# Patient Record
Sex: FEMALE | Race: WHITE | NOT HISPANIC OR LATINO
[De-identification: names, ages, dates, MRNs, and addresses within clinical notes are randomized per-mention and may not be internally consistent; named-entity substitution may affect disease eponyms.]

---

## 2017-02-03 ENCOUNTER — RESULT REVIEW (OUTPATIENT)
Age: 77
End: 2017-02-03

## 2017-02-04 ENCOUNTER — APPOINTMENT (OUTPATIENT)
Dept: DERMATOLOGY | Facility: CLINIC | Age: 77
End: 2017-02-04

## 2017-04-25 ENCOUNTER — APPOINTMENT (OUTPATIENT)
Dept: DERMATOLOGY | Facility: CLINIC | Age: 77
End: 2017-04-25

## 2017-05-03 ENCOUNTER — FORM ENCOUNTER (OUTPATIENT)
Age: 77
End: 2017-05-03

## 2017-05-04 ENCOUNTER — APPOINTMENT (OUTPATIENT)
Dept: CT IMAGING | Facility: CLINIC | Age: 77
End: 2017-05-04

## 2017-05-04 ENCOUNTER — OUTPATIENT (OUTPATIENT)
Dept: OUTPATIENT SERVICES | Facility: HOSPITAL | Age: 77
LOS: 1 days | End: 2017-05-04
Payer: MEDICARE

## 2017-05-04 DIAGNOSIS — R91.8 OTHER NONSPECIFIC ABNORMAL FINDING OF LUNG FIELD: ICD-10-CM

## 2017-05-04 PROCEDURE — 71250 CT THORAX DX C-: CPT

## 2017-05-09 ENCOUNTER — MESSAGE (OUTPATIENT)
Age: 77
End: 2017-05-09

## 2017-05-10 ENCOUNTER — APPOINTMENT (OUTPATIENT)
Dept: DERMATOLOGY | Facility: CLINIC | Age: 77
End: 2017-05-10

## 2017-05-10 ENCOUNTER — RESULT REVIEW (OUTPATIENT)
Age: 77
End: 2017-05-10

## 2017-05-22 ENCOUNTER — APPOINTMENT (OUTPATIENT)
Dept: PULMONOLOGY | Facility: CLINIC | Age: 77
End: 2017-05-22

## 2017-05-22 VITALS
DIASTOLIC BLOOD PRESSURE: 60 MMHG | HEART RATE: 66 BPM | HEIGHT: 59 IN | BODY MASS INDEX: 24.19 KG/M2 | SYSTOLIC BLOOD PRESSURE: 120 MMHG | OXYGEN SATURATION: 98 % | WEIGHT: 120 LBS

## 2017-05-24 ENCOUNTER — APPOINTMENT (OUTPATIENT)
Dept: DERMATOLOGY | Facility: CLINIC | Age: 77
End: 2017-05-24

## 2017-05-25 ENCOUNTER — APPOINTMENT (OUTPATIENT)
Dept: DERMATOLOGY | Facility: CLINIC | Age: 77
End: 2017-05-25

## 2017-09-06 ENCOUNTER — APPOINTMENT (OUTPATIENT)
Dept: DERMATOLOGY | Facility: CLINIC | Age: 77
End: 2017-09-06
Payer: MEDICARE

## 2017-09-06 PROCEDURE — 99212 OFFICE O/P EST SF 10 MIN: CPT

## 2017-11-20 ENCOUNTER — APPOINTMENT (OUTPATIENT)
Dept: PULMONOLOGY | Facility: CLINIC | Age: 77
End: 2017-11-20
Payer: MEDICARE

## 2017-11-20 VITALS
OXYGEN SATURATION: 98 % | BODY MASS INDEX: 24.19 KG/M2 | SYSTOLIC BLOOD PRESSURE: 120 MMHG | WEIGHT: 120 LBS | DIASTOLIC BLOOD PRESSURE: 64 MMHG | HEART RATE: 76 BPM | HEIGHT: 59 IN

## 2017-11-20 PROCEDURE — 85018 HEMOGLOBIN: CPT | Mod: QW

## 2017-11-20 PROCEDURE — 94727 GAS DIL/WSHOT DETER LNG VOL: CPT

## 2017-11-20 PROCEDURE — 94729 DIFFUSING CAPACITY: CPT

## 2017-11-20 PROCEDURE — 99215 OFFICE O/P EST HI 40 MIN: CPT | Mod: 25

## 2017-11-20 PROCEDURE — 94010 BREATHING CAPACITY TEST: CPT

## 2018-03-02 ENCOUNTER — FORM ENCOUNTER (OUTPATIENT)
Age: 78
End: 2018-03-02

## 2018-03-03 ENCOUNTER — APPOINTMENT (OUTPATIENT)
Dept: CT IMAGING | Facility: CLINIC | Age: 78
End: 2018-03-03
Payer: MEDICARE

## 2018-03-03 ENCOUNTER — OUTPATIENT (OUTPATIENT)
Dept: OUTPATIENT SERVICES | Facility: HOSPITAL | Age: 78
LOS: 1 days | End: 2018-03-03
Payer: MEDICARE

## 2018-03-03 DIAGNOSIS — R91.8 OTHER NONSPECIFIC ABNORMAL FINDING OF LUNG FIELD: ICD-10-CM

## 2018-03-03 PROCEDURE — 71250 CT THORAX DX C-: CPT

## 2018-03-03 PROCEDURE — 71250 CT THORAX DX C-: CPT | Mod: 26

## 2018-03-05 ENCOUNTER — RESULT REVIEW (OUTPATIENT)
Age: 78
End: 2018-03-05

## 2018-03-06 ENCOUNTER — APPOINTMENT (OUTPATIENT)
Dept: DERMATOLOGY | Facility: CLINIC | Age: 78
End: 2018-03-06
Payer: MEDICARE

## 2018-03-06 PROCEDURE — 11440 EXC FACE-MM B9+MARG 0.5 CM/<: CPT

## 2018-03-06 PROCEDURE — 11100 BX SKIN SUBCUTANEOUS&/MUCOUS MEMBRANE 1 LESION: CPT | Mod: 59

## 2018-03-06 PROCEDURE — 99213 OFFICE O/P EST LOW 20 MIN: CPT | Mod: 25

## 2018-03-12 ENCOUNTER — APPOINTMENT (OUTPATIENT)
Dept: PULMONOLOGY | Facility: CLINIC | Age: 78
End: 2018-03-12
Payer: MEDICARE

## 2018-03-12 ENCOUNTER — CHART COPY (OUTPATIENT)
Age: 78
End: 2018-03-12

## 2018-03-12 VITALS
DIASTOLIC BLOOD PRESSURE: 60 MMHG | WEIGHT: 120 LBS | OXYGEN SATURATION: 98 % | HEART RATE: 74 BPM | BODY MASS INDEX: 24.19 KG/M2 | HEIGHT: 59 IN | SYSTOLIC BLOOD PRESSURE: 120 MMHG

## 2018-03-12 PROCEDURE — 94010 BREATHING CAPACITY TEST: CPT

## 2018-03-12 PROCEDURE — 99215 OFFICE O/P EST HI 40 MIN: CPT | Mod: 25

## 2018-03-12 RX ORDER — AZELASTINE HYDROCHLORIDE 137 UG/1
0.1 SPRAY, METERED NASAL
Qty: 30 | Refills: 0 | Status: DISCONTINUED | COMMUNITY
Start: 2018-02-27

## 2018-03-12 RX ORDER — AMOXICILLIN AND CLAVULANATE POTASSIUM 875; 125 MG/1; MG/1
875-125 TABLET, COATED ORAL
Qty: 20 | Refills: 0 | Status: DISCONTINUED | COMMUNITY
Start: 2018-01-30

## 2018-03-12 RX ORDER — RANITIDINE 150 MG/1
150 TABLET ORAL
Qty: 90 | Refills: 0 | Status: ACTIVE | COMMUNITY
Start: 2018-02-21

## 2018-03-13 ENCOUNTER — APPOINTMENT (OUTPATIENT)
Dept: DERMATOLOGY | Facility: CLINIC | Age: 78
End: 2018-03-13

## 2018-03-15 ENCOUNTER — APPOINTMENT (OUTPATIENT)
Dept: DERMATOLOGY | Facility: CLINIC | Age: 78
End: 2018-03-15
Payer: MEDICARE

## 2018-03-15 PROCEDURE — ZZZZZ: CPT

## 2018-05-15 ENCOUNTER — APPOINTMENT (OUTPATIENT)
Dept: DERMATOLOGY | Facility: CLINIC | Age: 78
End: 2018-05-15

## 2018-06-15 ENCOUNTER — APPOINTMENT (OUTPATIENT)
Dept: DERMATOLOGY | Facility: CLINIC | Age: 78
End: 2018-06-15

## 2018-07-08 ENCOUNTER — FORM ENCOUNTER (OUTPATIENT)
Age: 78
End: 2018-07-08

## 2018-07-09 ENCOUNTER — APPOINTMENT (OUTPATIENT)
Dept: ULTRASOUND IMAGING | Facility: CLINIC | Age: 78
End: 2018-07-09
Payer: MEDICARE

## 2018-07-09 ENCOUNTER — OUTPATIENT (OUTPATIENT)
Dept: OUTPATIENT SERVICES | Facility: HOSPITAL | Age: 78
LOS: 1 days | End: 2018-07-09
Payer: MEDICARE

## 2018-07-09 ENCOUNTER — APPOINTMENT (OUTPATIENT)
Dept: PULMONOLOGY | Facility: CLINIC | Age: 78
End: 2018-07-09
Payer: MEDICARE

## 2018-07-09 ENCOUNTER — APPOINTMENT (OUTPATIENT)
Dept: RADIOLOGY | Facility: CLINIC | Age: 78
End: 2018-07-09
Payer: MEDICARE

## 2018-07-09 VITALS
SYSTOLIC BLOOD PRESSURE: 130 MMHG | HEART RATE: 81 BPM | BODY MASS INDEX: 25.6 KG/M2 | OXYGEN SATURATION: 95 % | DIASTOLIC BLOOD PRESSURE: 70 MMHG | HEIGHT: 59 IN | WEIGHT: 127 LBS

## 2018-07-09 DIAGNOSIS — R06.00 DYSPNEA, UNSPECIFIED: ICD-10-CM

## 2018-07-09 LAB — DEPRECATED D DIMER PPP IA-ACNC: 153 NG/ML DDU

## 2018-07-09 PROCEDURE — 99215 OFFICE O/P EST HI 40 MIN: CPT | Mod: 25

## 2018-07-09 PROCEDURE — 94010 BREATHING CAPACITY TEST: CPT

## 2018-07-09 PROCEDURE — 71046 X-RAY EXAM CHEST 2 VIEWS: CPT

## 2018-07-09 PROCEDURE — 93970 EXTREMITY STUDY: CPT

## 2018-07-09 PROCEDURE — 93970 EXTREMITY STUDY: CPT | Mod: 26

## 2018-07-09 PROCEDURE — 71046 X-RAY EXAM CHEST 2 VIEWS: CPT | Mod: 26

## 2018-07-09 RX ORDER — OMEPRAZOLE 40 MG/1
40 CAPSULE, DELAYED RELEASE ORAL
Qty: 30 | Refills: 0 | Status: DISCONTINUED | COMMUNITY
Start: 2018-02-10 | End: 2018-07-09

## 2018-07-09 RX ORDER — PANTOPRAZOLE 40 MG/1
40 TABLET, DELAYED RELEASE ORAL
Refills: 0 | Status: ACTIVE | COMMUNITY

## 2018-07-09 RX ORDER — PRAVASTATIN SODIUM 40 MG/1
40 TABLET ORAL
Refills: 0 | Status: ACTIVE | COMMUNITY

## 2018-07-24 ENCOUNTER — APPOINTMENT (OUTPATIENT)
Dept: DERMATOLOGY | Facility: CLINIC | Age: 78
End: 2018-07-24
Payer: MEDICARE

## 2018-07-24 PROCEDURE — 99213 OFFICE O/P EST LOW 20 MIN: CPT

## 2018-12-13 ENCOUNTER — APPOINTMENT (OUTPATIENT)
Dept: PULMONOLOGY | Facility: CLINIC | Age: 78
End: 2018-12-13
Payer: MEDICARE

## 2018-12-13 VITALS
BODY MASS INDEX: 24.24 KG/M2 | WEIGHT: 120 LBS | SYSTOLIC BLOOD PRESSURE: 132 MMHG | DIASTOLIC BLOOD PRESSURE: 74 MMHG | OXYGEN SATURATION: 97 % | HEART RATE: 84 BPM

## 2018-12-13 DIAGNOSIS — R91.8 OTHER NONSPECIFIC ABNORMAL FINDING OF LUNG FIELD: ICD-10-CM

## 2018-12-13 PROCEDURE — 99214 OFFICE O/P EST MOD 30 MIN: CPT

## 2018-12-13 RX ORDER — DICLOFENAC SODIUM 50 MG/1
50 TABLET, DELAYED RELEASE ORAL
Qty: 180 | Refills: 0 | Status: DISCONTINUED | COMMUNITY
Start: 2017-12-04 | End: 2018-12-13

## 2018-12-13 RX ORDER — AZELASTINE HYDROCHLORIDE AND FLUTICASONE PROPIONATE 137; 50 UG/1; UG/1
137-50 SPRAY, METERED NASAL
Qty: 1 | Refills: 3 | Status: DISCONTINUED | COMMUNITY
Start: 2017-05-22 | End: 2018-12-13

## 2019-01-27 ENCOUNTER — INPATIENT (INPATIENT)
Facility: HOSPITAL | Age: 79
LOS: 1 days | Discharge: HOME | DRG: 101 | End: 2019-01-29
Attending: INTERNAL MEDICINE | Admitting: HOSPITALIST
Payer: MEDICARE

## 2019-01-27 VITALS
HEART RATE: 104 BPM | SYSTOLIC BLOOD PRESSURE: 186 MMHG | DIASTOLIC BLOOD PRESSURE: 94 MMHG | WEIGHT: 119.93 LBS | TEMPERATURE: 98 F | OXYGEN SATURATION: 99 % | RESPIRATION RATE: 16 BRPM | HEIGHT: 62 IN

## 2019-01-27 DIAGNOSIS — R56.9 UNSPECIFIED CONVULSIONS: ICD-10-CM

## 2019-01-27 DIAGNOSIS — K21.0 GASTRO-ESOPHAGEAL REFLUX DISEASE WITH ESOPHAGITIS: ICD-10-CM

## 2019-01-27 DIAGNOSIS — I10 ESSENTIAL (PRIMARY) HYPERTENSION: ICD-10-CM

## 2019-01-27 DIAGNOSIS — E78.2 MIXED HYPERLIPIDEMIA: ICD-10-CM

## 2019-01-27 DIAGNOSIS — T78.40XD ALLERGY, UNSPECIFIED, SUBSEQUENT ENCOUNTER: ICD-10-CM

## 2019-01-27 LAB
ALBUMIN SERPL ELPH-MCNC: 4.1 G/DL — SIGNIFICANT CHANGE UP (ref 3.3–5.2)
ALP SERPL-CCNC: 103 U/L — SIGNIFICANT CHANGE UP (ref 40–120)
ALT FLD-CCNC: 21 U/L — SIGNIFICANT CHANGE UP
ANION GAP SERPL CALC-SCNC: 12 MMOL/L — SIGNIFICANT CHANGE UP (ref 5–17)
APTT BLD: 26.9 SEC — LOW (ref 27.5–36.3)
AST SERPL-CCNC: 23 U/L — SIGNIFICANT CHANGE UP
BASOPHILS # BLD AUTO: 0 K/UL — SIGNIFICANT CHANGE UP (ref 0–0.2)
BASOPHILS NFR BLD AUTO: 0.2 % — SIGNIFICANT CHANGE UP (ref 0–2)
BILIRUB SERPL-MCNC: 0.5 MG/DL — SIGNIFICANT CHANGE UP (ref 0.4–2)
BUN SERPL-MCNC: 18 MG/DL — SIGNIFICANT CHANGE UP (ref 8–20)
CALCIUM SERPL-MCNC: 9.7 MG/DL — SIGNIFICANT CHANGE UP (ref 8.6–10.2)
CHLORIDE SERPL-SCNC: 100 MMOL/L — SIGNIFICANT CHANGE UP (ref 98–107)
CO2 SERPL-SCNC: 28 MMOL/L — SIGNIFICANT CHANGE UP (ref 22–29)
CREAT SERPL-MCNC: 0.8 MG/DL — SIGNIFICANT CHANGE UP (ref 0.5–1.3)
EOSINOPHIL # BLD AUTO: 0.3 K/UL — SIGNIFICANT CHANGE UP (ref 0–0.5)
EOSINOPHIL NFR BLD AUTO: 1.7 % — SIGNIFICANT CHANGE UP (ref 0–6)
GLUCOSE SERPL-MCNC: 137 MG/DL — HIGH (ref 70–115)
HCT VFR BLD CALC: 43.9 % — SIGNIFICANT CHANGE UP (ref 37–47)
HGB BLD-MCNC: 14.2 G/DL — SIGNIFICANT CHANGE UP (ref 12–16)
INR BLD: 0.92 RATIO — SIGNIFICANT CHANGE UP (ref 0.88–1.16)
LACTATE BLDV-MCNC: 2 MMOL/L — SIGNIFICANT CHANGE UP (ref 0.5–2)
LYMPHOCYTES # BLD AUTO: 1.5 K/UL — SIGNIFICANT CHANGE UP (ref 1–4.8)
LYMPHOCYTES # BLD AUTO: 8.6 % — LOW (ref 20–55)
MCHC RBC-ENTMCNC: 27.6 PG — SIGNIFICANT CHANGE UP (ref 27–31)
MCHC RBC-ENTMCNC: 32.3 G/DL — SIGNIFICANT CHANGE UP (ref 32–36)
MCV RBC AUTO: 85.4 FL — SIGNIFICANT CHANGE UP (ref 81–99)
MONOCYTES # BLD AUTO: 1.2 K/UL — HIGH (ref 0–0.8)
MONOCYTES NFR BLD AUTO: 7 % — SIGNIFICANT CHANGE UP (ref 3–10)
NEUTROPHILS # BLD AUTO: 14.5 K/UL — HIGH (ref 1.8–8)
NEUTROPHILS NFR BLD AUTO: 81.7 % — HIGH (ref 37–73)
NT-PROBNP SERPL-SCNC: 75 PG/ML — SIGNIFICANT CHANGE UP (ref 0–300)
PLATELET # BLD AUTO: 399 K/UL — SIGNIFICANT CHANGE UP (ref 150–400)
POTASSIUM SERPL-MCNC: 3.7 MMOL/L — SIGNIFICANT CHANGE UP (ref 3.5–5.3)
POTASSIUM SERPL-SCNC: 3.7 MMOL/L — SIGNIFICANT CHANGE UP (ref 3.5–5.3)
PROT SERPL-MCNC: 7.2 G/DL — SIGNIFICANT CHANGE UP (ref 6.6–8.7)
PROTHROM AB SERPL-ACNC: 10.6 SEC — SIGNIFICANT CHANGE UP (ref 10–12.9)
RBC # BLD: 5.14 M/UL — SIGNIFICANT CHANGE UP (ref 4.4–5.2)
RBC # FLD: 14.3 % — SIGNIFICANT CHANGE UP (ref 11–15.6)
SODIUM SERPL-SCNC: 140 MMOL/L — SIGNIFICANT CHANGE UP (ref 135–145)
TROPONIN T SERPL-MCNC: <0.01 NG/ML — SIGNIFICANT CHANGE UP (ref 0–0.06)
WBC # BLD: 17.7 K/UL — HIGH (ref 4.8–10.8)
WBC # FLD AUTO: 17.7 K/UL — HIGH (ref 4.8–10.8)

## 2019-01-27 PROCEDURE — 99285 EMERGENCY DEPT VISIT HI MDM: CPT

## 2019-01-27 PROCEDURE — 99223 1ST HOSP IP/OBS HIGH 75: CPT | Mod: AI

## 2019-01-27 PROCEDURE — 93010 ELECTROCARDIOGRAM REPORT: CPT

## 2019-01-27 PROCEDURE — 70450 CT HEAD/BRAIN W/O DYE: CPT | Mod: 26

## 2019-01-27 RX ORDER — RANITIDINE HYDROCHLORIDE 150 MG/1
150 TABLET, FILM COATED ORAL DAILY
Qty: 0 | Refills: 0 | Status: DISCONTINUED | OUTPATIENT
Start: 2019-01-27 | End: 2019-01-28

## 2019-01-27 RX ORDER — INFLUENZA VIRUS VACCINE 15; 15; 15; 15 UG/.5ML; UG/.5ML; UG/.5ML; UG/.5ML
0.5 SUSPENSION INTRAMUSCULAR ONCE
Qty: 0 | Refills: 0 | Status: DISCONTINUED | OUTPATIENT
Start: 2019-01-27 | End: 2019-01-29

## 2019-01-27 RX ORDER — PANTOPRAZOLE SODIUM 20 MG/1
40 TABLET, DELAYED RELEASE ORAL
Qty: 0 | Refills: 0 | Status: DISCONTINUED | OUTPATIENT
Start: 2019-01-27 | End: 2019-01-29

## 2019-01-27 RX ORDER — LOSARTAN POTASSIUM 100 MG/1
25 TABLET, FILM COATED ORAL
Qty: 0 | Refills: 0 | Status: DISCONTINUED | OUTPATIENT
Start: 2019-01-27 | End: 2019-01-29

## 2019-01-27 RX ORDER — SODIUM CHLORIDE 9 MG/ML
1000 INJECTION INTRAMUSCULAR; INTRAVENOUS; SUBCUTANEOUS ONCE
Qty: 0 | Refills: 0 | Status: COMPLETED | OUTPATIENT
Start: 2019-01-27 | End: 2019-01-27

## 2019-01-27 RX ORDER — LEVETIRACETAM 250 MG/1
500 TABLET, FILM COATED ORAL
Qty: 0 | Refills: 0 | Status: DISCONTINUED | OUTPATIENT
Start: 2019-01-27 | End: 2019-01-29

## 2019-01-27 RX ORDER — ATORVASTATIN CALCIUM 80 MG/1
10 TABLET, FILM COATED ORAL AT BEDTIME
Qty: 0 | Refills: 0 | Status: DISCONTINUED | OUTPATIENT
Start: 2019-01-27 | End: 2019-01-29

## 2019-01-27 RX ORDER — ENOXAPARIN SODIUM 100 MG/ML
40 INJECTION SUBCUTANEOUS EVERY 24 HOURS
Qty: 0 | Refills: 0 | Status: DISCONTINUED | OUTPATIENT
Start: 2019-01-27 | End: 2019-01-29

## 2019-01-27 RX ORDER — MONTELUKAST 4 MG/1
10 TABLET, CHEWABLE ORAL AT BEDTIME
Qty: 0 | Refills: 0 | Status: DISCONTINUED | OUTPATIENT
Start: 2019-01-27 | End: 2019-01-29

## 2019-01-27 RX ADMIN — SODIUM CHLORIDE 2000 MILLILITER(S): 9 INJECTION INTRAMUSCULAR; INTRAVENOUS; SUBCUTANEOUS at 11:56

## 2019-01-27 RX ADMIN — LEVETIRACETAM 500 MILLIGRAM(S): 250 TABLET, FILM COATED ORAL at 15:56

## 2019-01-27 RX ADMIN — ATORVASTATIN CALCIUM 10 MILLIGRAM(S): 80 TABLET, FILM COATED ORAL at 22:32

## 2019-01-27 RX ADMIN — MONTELUKAST 10 MILLIGRAM(S): 4 TABLET, CHEWABLE ORAL at 22:32

## 2019-01-27 NOTE — H&P ADULT - NSHPPHYSICALEXAM_GEN_ALL_CORE
Normocephalic   EOMI PERRL  Neck supple no JVD  S1 and S2 regular   CTA B  Abd soft + BS not tender   No pedal edema, no calf tenderness no peripheral cyanosis  AAO X3 no focal neurologic deficit  No skin rash no eruptions on skin  Anxious.  Mood and affect are appropriate.

## 2019-01-27 NOTE — ED PROVIDER NOTE - ENMT, MLM
Airway patent, Nasal mucosa clear. Mouth with normal mucosa. dry blood in mouth  +small lac to right side of tongue

## 2019-01-27 NOTE — H&P ADULT - PROBLEM SELECTOR PLAN 1
Syncope is a consideration - Per Cardiology cardiogenic syncope is unlikely as patient had normal PET myocarial perfusion scan in 2018  Neurology evaluated , started on keppra 500mg po bid   EEG  MRA Brain   Mri Brain

## 2019-01-27 NOTE — ED ADULT NURSE NOTE - OBJECTIVE STATEMENT
Pt was upstairs complaining of pain in pit of her stomach overnight, nausea and vertigo, pt has been taking dramamine. Pt had breakfast this AM without issue, pt  states she was going to go upstairs and rest. When  went upstairs he states pt was slanted "slumped across the couch and not responsive" Pt  called 911, attempted to put pt on her back, and she kept rolling to L her side. Pt  states pt had bloody saliva coming out of her mouth "from biting her tongue and her lip" and having "snoring sounds" or snoring respirations during the episode. Pt states she remembers eating breakfast this morning but does not remember going upstairs or the events after.  Pt states she has had seizures before, last episode x7 years ago  states it was "diagnosed as syncope". Upon assessment - pt alert and oriented x3, pt with laceration to L side of bridge of nose, dried blood on lower lip, lac noted to b/l tounge, heart sounds are within normal limits, lung sounds are clear b/l, abd is soft and nontender - positive bowel sounds in all four quadrants, skin is warm, dry and appropriate for age and race. Pt and family educated on plan of care, plan of care taught back to RN. Will continue to educate and update pt throughout ED stay. Pt BIBA for "siezure" this AM. Pt states overnight she had stomach pain, nausea and vertigo, pt has been taking dramamine. Pt had breakfast this AM without issue, pt  states she was going to go upstairs and rest. When  went upstairs to look for her, he states pt was "unresponsive" and "slumped across the couch not responding to me shaking her" Pt  called 911, attempted to put pt on her back, and she kept rolling to L her side. Pt  states pt had bloody saliva coming out of her mouth "from biting her tongue and her lip" and making "snoring sounds" during the episode. Pt states she remembers eating breakfast this morning but does not remember going upstairs or the events after.  Pt states she has had seizures before when she was pregnant, and the last episode x7 years ago. Pt  states the last "siezure" was "diagnosed as syncope".   Upon assessment - pt alert and oriented x3, pt with laceration to L side of bridge of nose, dried blood on lower lip, lac noted to b/l tounge, heart sounds are within normal limits, lung sounds are clear b/l, abd is soft and nontender - positive bowel sounds in all four quadrants, skin is warm, dry and appropriate for age and race. Pt and family educated on plan of care, plan of care taught back to RN. Will continue to educate and update pt throughout ED stay.

## 2019-01-27 NOTE — ED ADULT NURSE REASSESSMENT NOTE - NS ED NURSE REASSESS COMMENT FT1
pt handed off to RN TANIA in [facility], in stable condition. Pt oriented to unit, plan of care explained. call bell system explained to pt. no apparent distress noted at this time.  remains at bedside.

## 2019-01-27 NOTE — ED PROVIDER NOTE - OBJECTIVE STATEMENT
79 yo female hx of HTN; presents with episode of LOC and AMS concerning for seizure vs syncope; patient awoke this AM feeling well, started as normal morning, breakfast and tea; went back upstairs to lay down, was on phone with a friend; after running an errand,  came back found her unconscious, sprawled out on cough, +dry blood in mouth, copious saliva; had apparently wet herself as well; EMS arrived, brought to hospital; patient with no memory of events; states had one seizure related to pregnancy approx 50 yrs ago; and one other episode similar to today's where she was hospitalized at [facility] but diagnosed with syncope approx 5 yrs ago

## 2019-01-27 NOTE — ED PROVIDER NOTE - MEDICAL DECISION MAKING DETAILS
apparent new onset seizure activity vs syncope; neuro and cards called ([facility]); as per neuro, start keppra; MR ordered

## 2019-01-27 NOTE — ED ADULT TRIAGE NOTE - CHIEF COMPLAINT QUOTE
Patient is awake and oriented times 3, brought in via ambulance, no family present on arrival, as per ems, patient had a seizure, history of seizure "a long time ago" patient has a laceration to the bridge of her nose, blood sugar in triage 163, patient denies any pain, no blood thinners reported

## 2019-01-27 NOTE — CONSULT NOTE ADULT - SUBJECTIVE AND OBJECTIVE BOX
Patient is a 78y old  Female who presents with a chief complaint of seizure    HPI:  78-yo female with a history of seizure and dizziness presented with an episode of recurrent seizure activity this morning. The patient was found to be unresponsive with mouth foaming, tongue biting and incontinence, followed with post-ictal confusion. The patient reported poor sleep last night. There was no fever, headache, nausea or vomiting. The patient has a history of intermittent dizziness / lightheadedness. Patient's  reported an episode " syncope" 5-6 years ago and had witnessed [place] seizure 40 years ago. The patient was on phenobarbital therapy.     PAST MEDICAL & SURGICAL HISTORY:  knee replacement    REVIEW OF SYSTEMS:  System review as documented in the record.       MEDICATIONS  (STANDING):    MEDICATIONS  (PRN):      Allergies    latex (Unknown)  No Known Drug Allergies    Intolerances        SOCIAL HISTORY:    FAMILY HISTORY:  Daughter : cerebral aneurysm      PHYSICAL EXAM:  Vital Signs Last 24 Hrs  T(F): 98.2 (01-27-19 @ 10:30)  HR: 104 (01-27-19 @ 10:30)  BP: 186/94 (01-27-19 @ 10:30)  RR: 16 (01-27-19 @ 10:30)    GENERAL: No acute distress  HEAD:  Atraumatic, Normocephalic  NERVOUS SYSTEM:    Alert & Oriented, speech fluent, follow commands  Cranial nerves exam: II-XII normal, except hearing impairment   Motor exam: no pronator drift,  Strength 5/5 B/L upper and lower extremities.  Sensory exam: normal.  DTR: 1-2+ symmetric, plantar responses flexor bilaterally.  No dysmetria bilaterally      LABS:                        14.2   17.7  )-----------( 399      ( 27 Jan 2019 11:51 )             43.9     01-27    140  |  100  |  18.0  ----------------------------<  137<H>  3.7   |  28.0  |  0.80    Ca    9.7      27 Jan 2019 11:51    TPro  7.2  /  Alb  4.1  /  TBili  0.5  /  DBili  x   /  AST  23  /  ALT  21  /  AlkPhos  103  01-27    PT/INR - ( 27 Jan 2019 11:51 )   PT: 10.6 sec;   INR: 0.92 ratio         PTT - ( 27 Jan 2019 11:51 )  PTT:26.9 sec        RADIOLOGY & ADDITIONAL STUDIES:  Head CT IMPRESSION:  Mild chronic microvascular changes without evidence of an   acute transcortical infarction or hemorrhage. MR is a more sensitive   imaging modality for the evaluation of an acute infarction.     IMPRESSION:  Recurrent seizure, history of seizure disorder  History of dizziness  Family history of cerebral aneurysm    PLAN:  - Brain MRI w/o contrast  - Head MRA w/o contrast  - EEG  - discussed with patient's family regarding seizure treatment, benefit for treatment, side effects of medication and risk w/o treatment were discussed  - patient and family agree to take Keppra (500mg BID)  - Ativan PRN for seizure  - we also discussed safety issues, including driving. Patient will follow [place] law.
[facility], [facility]                                   [address] [place] [address]                                                      PHONE: [phone]                                                         FAX: [phone]  [email or web address]  ---------------------------------------------------------------------------------------------------------------------------------    Reason for Consult: syncope    HPI:  IGA AUGIRRE is an 78y Female HTN, remote seizure history presented this morning after being found unresponsive by her . Her  says he found the patient slumped over on the couch with blood tinged sputum coming from her mouth. Upon arrival to ED she was confused. The last thing the patient remembers was watching TV this morning. She had a seizure approximately 50 years ago. She denies any cardiovascular symptoms prior to or after today's episode.     PAST MEDICAL & SURGICAL HISTORY:  HTN (hypertension)      latex (Unknown)  No Known Drug Allergies      MEDICATIONS  (STANDING):  levETIRAcetam 500 milliGRAM(s) Oral two times a day    MEDICATIONS  (PRN):  LORazepam   Injectable 2 milliGRAM(s) IntraMuscular every 6 hours PRN for seizure      Social History:  Cigarettes: none               Alchohol:                 Illicit Drug Abuse:      Family History:  no CAD    ROS: Negative other than as mentioned in HPI.    Vital Signs Last 24 Hrs  T(C): 36.8 (27 Jan 2019 10:30), Max: 36.8 (27 Jan 2019 10:30)  T(F): 98.2 (27 Jan 2019 10:30), Max: 98.2 (27 Jan 2019 10:30)  HR: 83 (27 Jan 2019 16:15) (83 - 104)  BP: 160/73 (27 Jan 2019 16:15) (160/73 - 186/94)  BP(mean): --  RR: 18 (27 Jan 2019 16:15) (16 - 18)  SpO2: 100% (27 Jan 2019 16:15) (99% - 100%)  ICU Vital Signs Last 24 Hrs  GIA AGUIRRE  I&O's Detail    I&O's Summary    Drug Dosing Weight  GIA AGUIRRE      PHYSICAL EXAM:  General: Appears well developed, well nourished alert and cooperative.  HEENT: Head; normocephalic, atraumatic. bite marks on both sides of tongue with dry blood  Eyes: Pupils reactive, cornea wnl.  Neck: Supple, no nodes adenopathy, no NVD or carotid bruit or thyromegaly.  CARDIOVASCULAR: Normal S1 and S2, No murmur, rub, gallop or lift.   LUNGS: No rales, rhonchi or wheeze. Normal breath sounds bilaterally.  ABDOMEN: Soft, nontender without mass or organomegaly. bowel sounds normoactive.  EXTREMITIES: No clubbing, cyanosis or edema. Distal pulses wnl.   SKIN: warm and dry with normal turgor.  NEURO: Alert/oriented x 3/normal motor exam. No pathologic reflexes.    PSYCH: normal affect.        LABS:                        14.2   17.7  )-----------( 399      ( 27 Jan 2019 11:51 )             43.9     01-27    140  |  100  |  18.0  ----------------------------<  137<H>  3.7   |  28.0  |  0.80    Ca    9.7      27 Jan 2019 11:51    TPro  7.2  /  Alb  4.1  /  TBili  0.5  /  DBili  x   /  AST  23  /  ALT  21  /  AlkPhos  103  01-27    GIA AGUIRRE  CARDIAC MARKERS ( 27 Jan 2019 11:51 )  x     / <0.01 ng/mL / x     / x     / x          PT/INR - ( 27 Jan 2019 11:51 )   PT: 10.6 sec;   INR: 0.92 ratio         PTT - ( 27 Jan 2019 11:51 )  PTT:26.9 sec      RADIOLOGY & ADDITIONAL STUDIES:    INTERPRETATION OF TELEMETRY (personally reviewed): sinus rhythm without arrhythmia    Assessment and Plan:  In summary, GIA AGUIRRE is an 78y Female with past medical history significant for HTN, remote seizure history admitted with seizure episode this morning. No clinical evidence of cardiogenic syncope. Telemetry is normal. Patient had normal PET myocardial perfusion scan and echo in 2018.    Seizure  - no further inpatient cardiac workup indicated at this time. I will arrange for close outpatient follow up with Dr. Grayson.    Thank you for allowing [facility] to participate in the care of this patient. Please do not hesitate to call with any questions or concerns.

## 2019-01-27 NOTE — H&P ADULT - HISTORY OF PRESENT ILLNESS
78 yr female with history of hypertension controlled with diovan , has had one episode  seizure 40 years ago and a syncope or passed out 6 yrs ago.   said she was talking to someone on the phone morning of admission sitting on a couch and suddenly went silent.  The phone continued to to ring a pause or hold note, so  came to see what was amiss and found her slumped on the couch unresponsive, foaming from her mouth, blood tinge, bitten her tongue on both sides, wet and sonorous respiration.   called the ambulance.  Patient came to in the ambulance about 10-15 minutes after the episode.  Labs show wbc elevation 17.7  brain ct show no acute pathology .  Patient now full awake orientated and conversant.

## 2019-01-27 NOTE — ED ADULT NURSE NOTE - NSIMPLEMENTINTERV_GEN_ALL_ED
Implemented All Universal Safety Interventions:  [place] to call system. Call bell, personal items and telephone within reach. Instruct patient to call for assistance. Room bathroom lighting operational. Non-slip footwear when patient is off stretcher. Physically safe environment: no spills, clutter or unnecessary equipment. Stretcher in lowest position, wheels locked, appropriate side rails in place.

## 2019-01-27 NOTE — H&P ADULT - FAMILY HISTORY
No pertinent family history in first degree relatives Mother  Still living? No  Family history of cerebrovascular accident (CVA) in mother, Age at diagnosis: Age Unknown  Family history of acute myocardial infarction, Age at diagnosis: Age Unknown     Father  Still living? Unknown  Family history of diabetes mellitus in father, Age at diagnosis: Age Unknown

## 2019-01-28 LAB
ANION GAP SERPL CALC-SCNC: 10 MMOL/L — SIGNIFICANT CHANGE UP (ref 5–17)
BUN SERPL-MCNC: 11 MG/DL — SIGNIFICANT CHANGE UP (ref 8–20)
CALCIUM SERPL-MCNC: 9.1 MG/DL — SIGNIFICANT CHANGE UP (ref 8.6–10.2)
CHLORIDE SERPL-SCNC: 104 MMOL/L — SIGNIFICANT CHANGE UP (ref 98–107)
CO2 SERPL-SCNC: 26 MMOL/L — SIGNIFICANT CHANGE UP (ref 22–29)
CREAT SERPL-MCNC: 0.61 MG/DL — SIGNIFICANT CHANGE UP (ref 0.5–1.3)
GLUCOSE SERPL-MCNC: 106 MG/DL — SIGNIFICANT CHANGE UP (ref 70–115)
HCT VFR BLD CALC: 44.2 % — SIGNIFICANT CHANGE UP (ref 37–47)
HGB BLD-MCNC: 14.3 G/DL — SIGNIFICANT CHANGE UP (ref 12–16)
MCHC RBC-ENTMCNC: 28.2 PG — SIGNIFICANT CHANGE UP (ref 27–31)
MCHC RBC-ENTMCNC: 32.4 G/DL — SIGNIFICANT CHANGE UP (ref 32–36)
MCV RBC AUTO: 87.2 FL — SIGNIFICANT CHANGE UP (ref 81–99)
PLATELET # BLD AUTO: 355 K/UL — SIGNIFICANT CHANGE UP (ref 150–400)
POTASSIUM SERPL-MCNC: 4 MMOL/L — SIGNIFICANT CHANGE UP (ref 3.5–5.3)
POTASSIUM SERPL-SCNC: 4 MMOL/L — SIGNIFICANT CHANGE UP (ref 3.5–5.3)
RBC # BLD: 5.07 M/UL — SIGNIFICANT CHANGE UP (ref 4.4–5.2)
RBC # FLD: 14.4 % — SIGNIFICANT CHANGE UP (ref 11–15.6)
SODIUM SERPL-SCNC: 140 MMOL/L — SIGNIFICANT CHANGE UP (ref 135–145)
WBC # BLD: 10.6 K/UL — SIGNIFICANT CHANGE UP (ref 4.8–10.8)
WBC # FLD AUTO: 10.6 K/UL — SIGNIFICANT CHANGE UP (ref 4.8–10.8)

## 2019-01-28 PROCEDURE — 99233 SBSQ HOSP IP/OBS HIGH 50: CPT

## 2019-01-28 PROCEDURE — 93880 EXTRACRANIAL BILAT STUDY: CPT | Mod: 26

## 2019-01-28 PROCEDURE — 95819 EEG AWAKE AND ASLEEP: CPT | Mod: 26

## 2019-01-28 RX ORDER — FAMOTIDINE 10 MG/ML
20 INJECTION INTRAVENOUS DAILY
Qty: 0 | Refills: 0 | Status: DISCONTINUED | OUTPATIENT
Start: 2019-01-28 | End: 2019-01-29

## 2019-01-28 RX ADMIN — LEVETIRACETAM 500 MILLIGRAM(S): 250 TABLET, FILM COATED ORAL at 06:28

## 2019-01-28 RX ADMIN — PANTOPRAZOLE SODIUM 40 MILLIGRAM(S): 20 TABLET, DELAYED RELEASE ORAL at 06:28

## 2019-01-28 RX ADMIN — MONTELUKAST 10 MILLIGRAM(S): 4 TABLET, CHEWABLE ORAL at 22:27

## 2019-01-28 RX ADMIN — LEVETIRACETAM 500 MILLIGRAM(S): 250 TABLET, FILM COATED ORAL at 17:26

## 2019-01-28 RX ADMIN — LOSARTAN POTASSIUM 25 MILLIGRAM(S): 100 TABLET, FILM COATED ORAL at 06:28

## 2019-01-28 RX ADMIN — ATORVASTATIN CALCIUM 10 MILLIGRAM(S): 80 TABLET, FILM COATED ORAL at 22:27

## 2019-01-28 RX ADMIN — ENOXAPARIN SODIUM 40 MILLIGRAM(S): 100 INJECTION SUBCUTANEOUS at 11:06

## 2019-01-28 RX ADMIN — FAMOTIDINE 20 MILLIGRAM(S): 10 INJECTION INTRAVENOUS at 11:07

## 2019-01-28 RX ADMIN — LOSARTAN POTASSIUM 25 MILLIGRAM(S): 100 TABLET, FILM COATED ORAL at 17:26

## 2019-01-28 NOTE — PROGRESS NOTE ADULT - SUBJECTIVE AND OBJECTIVE BOX
CC: Seizure episode with tongue bitting .   HPI:  78 yr female with history of hypertension controlled with diovan , has had one episode  seizure 40 years ago and a syncope or passed out 6 yrs ago.   said she was talking to someone on the phone morning of admission sitting on a couch and suddenly went silent.  The phone continued to to ring a pause or hold note, so  came to see what was amiss and found her slumped on the couch unresponsive, foaming from her mouth, blood tinge, bitten her tongue on both sides, wet and sonorous respiration.   called the ambulance.  Patient came to in the ambulance about 10-15 minutes after the episode.  Labs show wbc elevation 17.7  brain ct show no acute pathology .  Patient now full awake orientated and conversant. (27 Jan 2019 18:08)    REVIEW OF SYSTEMS:    Patient denied fever, chills, abdominal pain, nausea, vomiting, cough, shortness of breath, chest pain or palpitations    Vital Signs Last 24 Hrs  T(C): 36.7 (28 Jan 2019 16:30), Max: 37.6 (28 Jan 2019 11:29)  T(F): 98.1 (28 Jan 2019 16:30), Max: 99.6 (28 Jan 2019 11:29)  HR: 71 (28 Jan 2019 16:30) (67 - 75)  BP: 124/69 (28 Jan 2019 16:30) (107/60 - 145/74)  BP(mean): --  RR: 18 (28 Jan 2019 16:30) (16 - 18)  SpO2: 97% (28 Jan 2019 16:30) (93% - 97%)I&O's Summary    PHYSICAL EXAM:  GENERAL: NAD, well-groomed  HEENT: PERRL, +EOMI, anicteric, no [place]  NECK: Supple, No JVD   CHEST/LUNG: CTA bilaterally; Normal effort  HEART: S1S2 Normal intensity, no murmurs, gallops or rubs noted  ABDOMEN: Soft, BS Normoactive, NT, ND, no HSM noted  EXTREMITIES:  2+ radial and DP pulses noted, no clubbing, cyanosis, or edema noted, FROM x 4  SKIN: No rashes or lesions noted  NEURO: A&Ox3, no focal deficits noted, CN II-XII intact  PSYCH: normal mood and affect; insight/judgement appropriate  LABS:                        14.3   10.6  )-----------( 355      ( 28 Jan 2019 06:51 )             44.2     01-28    140  |  104  |  11.0  ----------------------------<  106  4.0   |  26.0  |  0.61    Ca    9.1      28 Jan 2019 06:51    TPro  7.2  /  Alb  4.1  /  TBili  0.5  /  DBili  x   /  AST  23  /  ALT  21  /  AlkPhos  103  01-27    PT/INR - ( 27 Jan 2019 11:51 )   PT: 10.6 sec;   INR: 0.92 ratio         PTT - ( 27 Jan 2019 11:51 )  PTT:26.9 sec    RADIOLOGY & ADDITIONAL TESTS:    MEDICATIONS:  MEDICATIONS  (STANDING):  atorvastatin 10 milliGRAM(s) Oral at bedtime  enoxaparin Injectable 40 milliGRAM(s) SubCutaneous every 24 hours  famotidine    Tablet 20 milliGRAM(s) Oral daily  influenza   Vaccine 0.5 milliLiter(s) IntraMuscular once  levETIRAcetam 500 milliGRAM(s) Oral two times a day  losartan 25 milliGRAM(s) Oral two times a day  montelukast 10 milliGRAM(s) Oral at bedtime  pantoprazole    Tablet 40 milliGRAM(s) Oral before breakfast    MEDICATIONS  (PRN):  LORazepam   Injectable 2 milliGRAM(s) IntraMuscular every 6 hours PRN for seizure

## 2019-01-28 NOTE — PROGRESS NOTE ADULT - SUBJECTIVE AND OBJECTIVE BOX
INTERVAL HISTORY:  no further spells, feels welll=      VITAL SIGNS:  Vital Signs Last 24 Hrs  T(C): 36.5 (28 Jan 2019 07:32), Max: 36.8 (27 Jan 2019 10:30)  T(F): 97.7 (28 Jan 2019 07:32), Max: 98.3 (27 Jan 2019 23:57)  HR: 71 (28 Jan 2019 07:32) (67 - 104)  BP: 107/60 (28 Jan 2019 07:32) (107/60 - 186/94)  BP(mean): --  RR: 16 (28 Jan 2019 07:32) (16 - 18)  SpO2: 93% (28 Jan 2019 07:32) (93% - 100%)    PHYSICAL EXAMINATION:    Mentation:  nl  Language/Speech: nl  CN: nl   Visual Fields:ful  Motor: nl  Sensory: nl  DTR: nl  Babinski:sunny      MEDS:  MEDICATIONS  (STANDING):  atorvastatin 10 milliGRAM(s) Oral at bedtime  enoxaparin Injectable 40 milliGRAM(s) SubCutaneous every 24 hours  famotidine    Tablet 20 milliGRAM(s) Oral daily  influenza   Vaccine 0.5 milliLiter(s) IntraMuscular once  levETIRAcetam 500 milliGRAM(s) Oral two times a day  losartan 25 milliGRAM(s) Oral two times a day  montelukast 10 milliGRAM(s) Oral at bedtime  pantoprazole    Tablet 40 milliGRAM(s) Oral before breakfast    MEDICATIONS  (PRN):  LORazepam   Injectable 2 milliGRAM(s) IntraMuscular every 6 hours PRN for seizure      LABS:                          14.3   10.6  )-----------( 355      ( 28 Jan 2019 06:51 )             44.2     01-28    140  |  104  |  11.0  ----------------------------<  106  4.0   |  26.0  |  0.61    Ca    9.1      28 Jan 2019 06:51    TPro  7.2  /  Alb  4.1  /  TBili  0.5  /  DBili  x   /  AST  23  /  ALT  21  /  AlkPhos  103  01-27    LIVER FUNCTIONS - ( 27 Jan 2019 11:51 )  Alb: 4.1 g/dL / Pro: 7.2 g/dL / ALK PHOS: 103 U/L / ALT: 21 U/L / AST: 23 U/L / GGT: x               RADIOLOGY & ADDITIONAL STUDIES:    MRI, MRA, EEG-pending    IMPRESSION & PLAN:    Syncope vs seizure    Plan  Medical and Cardiac evaluation and treatment as indicated  MRI, MRA, EEG, duplex  Continue [facility]  Outpatient  24 hour EEG

## 2019-01-28 NOTE — PROGRESS NOTE ADULT - ASSESSMENT
78 yr female with history of hypertension presenting with seizure.        Problem/Plan - 1:  ·  Problem: New onset seizure.  Plan: Syncope is a consideration - Per Cardiology cardiogenic syncope is unlikely as patient had normal PET myocarial perfusion scan in 2018  Neurology evaluated , started on keppra 500mg po bid   EEG done awaiting report   MRA Brain pending   Mri Brain. pending   Carotid dopplers no hemodynamically significant stenosis.      Problem/Plan - 2:  ·  Problem: Essential hypertension.  Plan: Diovan 40mg po bid.      Problem/Plan - 3:  ·  Problem: Gastroesophageal reflux disease with esophagitis.  Plan: zantac 150mg po qHS  Protonix 40mg po daily AM.      Problem/Plan - 4:  ·  Problem: Mixed hyperlipidemia.  Plan: Pravastatin 20mg po daily.      Problem/Plan - 5:  ·  Problem: Allergic state, subsequent encounter.  Plan: Singulair 10mg po qhs.     Dispo;  Awaiting MRI/MRA.  If no significant abnormality will discharge home tomorrow.

## 2019-01-29 ENCOUNTER — TRANSCRIPTION ENCOUNTER (OUTPATIENT)
Age: 79
End: 2019-01-29

## 2019-01-29 VITALS
TEMPERATURE: 99 F | HEART RATE: 89 BPM | SYSTOLIC BLOOD PRESSURE: 111 MMHG | DIASTOLIC BLOOD PRESSURE: 61 MMHG | RESPIRATION RATE: 20 BRPM

## 2019-01-29 PROCEDURE — 82962 GLUCOSE BLOOD TEST: CPT

## 2019-01-29 PROCEDURE — 70551 MRI BRAIN STEM W/O DYE: CPT | Mod: 26

## 2019-01-29 PROCEDURE — 99239 HOSP IP/OBS DSCHRG MGMT >30: CPT

## 2019-01-29 PROCEDURE — 99285 EMERGENCY DEPT VISIT HI MDM: CPT

## 2019-01-29 PROCEDURE — 70450 CT HEAD/BRAIN W/O DYE: CPT

## 2019-01-29 PROCEDURE — 70551 MRI BRAIN STEM W/O DYE: CPT

## 2019-01-29 PROCEDURE — 36415 COLL VENOUS BLD VENIPUNCTURE: CPT

## 2019-01-29 PROCEDURE — 93005 ELECTROCARDIOGRAM TRACING: CPT

## 2019-01-29 PROCEDURE — 70544 MR ANGIOGRAPHY HEAD W/O DYE: CPT | Mod: 26,59

## 2019-01-29 PROCEDURE — 80053 COMPREHEN METABOLIC PANEL: CPT

## 2019-01-29 PROCEDURE — 84484 ASSAY OF TROPONIN QUANT: CPT

## 2019-01-29 PROCEDURE — 85610 PROTHROMBIN TIME: CPT

## 2019-01-29 PROCEDURE — 93880 EXTRACRANIAL BILAT STUDY: CPT

## 2019-01-29 PROCEDURE — 95819 EEG AWAKE AND ASLEEP: CPT

## 2019-01-29 PROCEDURE — 80048 BASIC METABOLIC PNL TOTAL CA: CPT

## 2019-01-29 PROCEDURE — 85730 THROMBOPLASTIN TIME PARTIAL: CPT

## 2019-01-29 PROCEDURE — 83880 ASSAY OF NATRIURETIC PEPTIDE: CPT

## 2019-01-29 PROCEDURE — 85027 COMPLETE CBC AUTOMATED: CPT

## 2019-01-29 PROCEDURE — 97163 PT EVAL HIGH COMPLEX 45 MIN: CPT

## 2019-01-29 PROCEDURE — 70544 MR ANGIOGRAPHY HEAD W/O DYE: CPT

## 2019-01-29 PROCEDURE — 83605 ASSAY OF LACTIC ACID: CPT

## 2019-01-29 RX ORDER — DOCUSATE SODIUM 100 MG
100 CAPSULE ORAL
Qty: 0 | Refills: 0 | Status: DISCONTINUED | OUTPATIENT
Start: 2019-01-29 | End: 2019-01-29

## 2019-01-29 RX ORDER — POLYETHYLENE GLYCOL 3350 17 G/17G
17 POWDER, FOR SOLUTION ORAL
Qty: 0 | Refills: 0 | DISCHARGE
Start: 2019-01-29

## 2019-01-29 RX ORDER — POLYETHYLENE GLYCOL 3350 17 G/17G
17 POWDER, FOR SOLUTION ORAL DAILY
Qty: 0 | Refills: 0 | Status: DISCONTINUED | OUTPATIENT
Start: 2019-01-29 | End: 2019-01-29

## 2019-01-29 RX ORDER — LEVETIRACETAM 250 MG/1
1 TABLET, FILM COATED ORAL
Qty: 60 | Refills: 0
Start: 2019-01-29 | End: 2019-02-27

## 2019-01-29 RX ORDER — DIPHENHYDRAMINE HYDROCHLORIDE AND LIDOCAINE HYDROCHLORIDE AND ALUMINUM HYDROXIDE AND MAGNESIUM HYDRO
10 KIT THREE TIMES A DAY
Qty: 0 | Refills: 0 | Status: DISCONTINUED | OUTPATIENT
Start: 2019-01-29 | End: 2019-01-29

## 2019-01-29 RX ADMIN — PANTOPRAZOLE SODIUM 40 MILLIGRAM(S): 20 TABLET, DELAYED RELEASE ORAL at 06:05

## 2019-01-29 RX ADMIN — LEVETIRACETAM 500 MILLIGRAM(S): 250 TABLET, FILM COATED ORAL at 18:21

## 2019-01-29 RX ADMIN — LOSARTAN POTASSIUM 25 MILLIGRAM(S): 100 TABLET, FILM COATED ORAL at 18:21

## 2019-01-29 RX ADMIN — LEVETIRACETAM 500 MILLIGRAM(S): 250 TABLET, FILM COATED ORAL at 05:10

## 2019-01-29 RX ADMIN — DIPHENHYDRAMINE HYDROCHLORIDE AND LIDOCAINE HYDROCHLORIDE AND ALUMINUM HYDROXIDE AND MAGNESIUM HYDRO 10 MILLILITER(S): KIT at 18:21

## 2019-01-29 RX ADMIN — LOSARTAN POTASSIUM 25 MILLIGRAM(S): 100 TABLET, FILM COATED ORAL at 05:10

## 2019-01-29 RX ADMIN — POLYETHYLENE GLYCOL 3350 17 GRAM(S): 17 POWDER, FOR SOLUTION ORAL at 18:21

## 2019-01-29 NOTE — DISCHARGE NOTE ADULT - PLAN OF CARE
. start Keppra 500mg bid  f/u Neurology c/w home medication Diovan - discuss with PCP to switch to Losartan c/w home medication

## 2019-01-29 NOTE — PROGRESS NOTE ADULT - SUBJECTIVE AND OBJECTIVE BOX
INTERVAL HISTORY:    stable, no further spells    VITAL SIGNS:  Vital Signs Last 24 Hrs  T(C): 36.9 (29 Jan 2019 04:41), Max: 37.6 (28 Jan 2019 11:29)  T(F): 98.4 (29 Jan 2019 04:41), Max: 99.6 (28 Jan 2019 11:29)  HR: 74 (29 Jan 2019 07:24) (66 - 79)  BP: 129/69 (29 Jan 2019 04:41) (116/70 - 133/71)  BP(mean): --  RR: 16 (28 Jan 2019 20:54) (16 - 18)  SpO2: 94% (29 Jan 2019 04:41) (94% - 97%)    PHYSICAL EXAMINATION:    Mentation:  nl  Language/Speech: nl  CN:  Visual Fields: full  Motor:nl  Sensory:  DTR:  Babinski:      MEDS:  MEDICATIONS  (STANDING):  atorvastatin 10 milliGRAM(s) Oral at bedtime  docusate sodium 100 milliGRAM(s) Oral two times a day  enoxaparin Injectable 40 milliGRAM(s) SubCutaneous every 24 hours  famotidine    Tablet 20 milliGRAM(s) Oral daily  FIRST- Mouthwash  BLM 10 milliLiter(s) Swish and Spit three times a day  influenza   Vaccine 0.5 milliLiter(s) IntraMuscular once  levETIRAcetam 500 milliGRAM(s) Oral two times a day  losartan 25 milliGRAM(s) Oral two times a day  montelukast 10 milliGRAM(s) Oral at bedtime  pantoprazole    Tablet 40 milliGRAM(s) Oral before breakfast  polyethylene glycol 3350 17 Gram(s) Oral daily    MEDICATIONS  (PRN):  LORazepam   Injectable 2 milliGRAM(s) IntraMuscular every 6 hours PRN for seizure      LABS:                          14.3   10.6  )-----------( 355      ( 28 Jan 2019 06:51 )             44.2     01-28    140  |  104  |  11.0  ----------------------------<  106  4.0   |  26.0  |  0.61    Ca    9.1      28 Jan 2019 06:51    TPro  7.2  /  Alb  4.1  /  TBili  0.5  /  DBili  x   /  AST  23  /  ALT  21  /  AlkPhos  103  01-27    LIVER FUNCTIONS - ( 27 Jan 2019 11:51 )  Alb: 4.1 g/dL / Pro: 7.2 g/dL / ALK PHOS: 103 U/L / ALT: 21 U/L / AST: 23 U/L / GGT: x               RADIOLOGY & ADDITIONAL STUDIES:    duplex- neg  EEG- complete,will review  MRI/A pending    IMPRESSION & PLAN:    Continue current treatment  Further recommendations to follow work up

## 2019-01-29 NOTE — DISCHARGE NOTE ADULT - SECONDARY DIAGNOSIS.
Essential hypertension Gastroesophageal reflux disease with esophagitis Mixed hyperlipidemia Allergic state, subsequent encounter

## 2019-01-29 NOTE — DISCHARGE NOTE ADULT - MEDICATION SUMMARY - MEDICATIONS TO TAKE
I will START or STAY ON the medications listed below when I get home from the hospital:    valsartan 40 mg oral tablet  -- 1 tab(s) by mouth 2 times a day  -- Indication: For Home medication - discuss with PCP to switch to losartan    Keppra 500 mg oral tablet  -- 1 tab(s) by mouth 2 times a day   -- Check with your doctor before becoming pregnant.  It is very important that you take or use this exactly as directed.  Do not skip doses or discontinue unless directed by your doctor.  May cause drowsiness or dizziness.  Obtain medical advice before taking any non-prescription drugs as some may affect the action of this medication.  Swallow whole.  Do not crush.  This drug may impair the ability to drive or operate machinery.  Use care until you become familiar with its effects.    -- Indication: For seizure    pravastatin 40 mg oral tablet  -- 1 tab(s) by mouth once a day  -- Indication: For Hld    Advair Diskus 250 mcg-50 mcg inhalation powder  -- 1 puff(s) inhaled 2 times a day  -- Indication: For Home medication    raNITIdine 150 mg oral capsule  -- 1 cap(s) by mouth once a day  -- Indication: For GERDf    polyethylene glycol 3350 oral powder for reconstitution  -- 17 gram(s) by mouth once a day  -- Indication: For Constipation    montelukast 10 mg oral tablet  -- 1 tab(s) by mouth once a day  -- Indication: For Allergic state, subsequent encounter    azelastine 137 mcg/inh (0.1%) nasal spray  -- 2 spray(s) into nose once a day, As Needed  -- Indication: For Allergic state, subsequent encounter    pantoprazole 40 mg oral delayed release tablet  -- 1 tab(s) by mouth once a day  -- Indication: For GERD (gastroesophageal reflux disease)

## 2019-01-29 NOTE — PHYSICAL THERAPY INITIAL EVALUATION ADULT - ADDITIONAL COMMENTS
per patient she has ~12 steps total to get to the living space with a single hand rail. Pt owns a RW however does not use one. Her  will be home to assist as needed.

## 2019-01-29 NOTE — DISCHARGE NOTE ADULT - CARE PLAN
Principal Discharge DX:	New onset seizure  Goal:	.  Assessment and plan of treatment:	start Keppra 500mg bid  f/u Neurology  Secondary Diagnosis:	Essential hypertension  Assessment and plan of treatment:	c/w home medication Diovan - discuss with PCP to switch to Losartan  Secondary Diagnosis:	Gastroesophageal reflux disease with esophagitis  Assessment and plan of treatment:	c/w home medication  Secondary Diagnosis:	Mixed hyperlipidemia  Secondary Diagnosis:	Allergic state, subsequent encounter

## 2019-01-29 NOTE — PROGRESS NOTE ADULT - ASSESSMENT
78 yr female with history of hypertension presenting with seizure. Seen by cardiology and Neurology, Per Cardiology cardiogenic syncope is unlikely as patient had normal PET myocarial perfusion scan in 2018, per Neurology started on Keppra.       A/P    >: New onset seizure.  Plan: Syncope is a consideration - Per Cardiology cardiogenic syncope is unlikely as patient had normal PET myocarial perfusion scan in 2018  Neurology evaluated , started on keppra 500mg po bid   EEG Is a borderline abnormal study only due to the presence of  occasional bilateral frontotemporal slowing. It is a nonspecific finding.  There are no seizure discharges a localizing features  MRA Brain pending   Mri Brain. pending   Carotid dopplers no hemodynamically significant stenosis.     > Essential hypertension.  Plan: Diovan 40mg po bid.     >Gastroesophageal reflux disease with esophagitis.  Plan: zantac 150mg po qHS  Protonix 40mg po daily AM.     > Mixed hyperlipidemia.  Plan: Pravastatin 20mg po daily.     >Allergic state, subsequent encounter.  Plan: Singulair 10mg po qhs.     >DVT PPX - Lovenox 78 yr female with history of hypertension presenting with seizure. Seen by cardiology and Neurology, Per Cardiology cardiogenic syncope is unlikely as patient had normal PET myocarial perfusion scan in 2018, per Neurology started on Keppra.       A/P    >: New onset seizure.  Plan: Syncope is a consideration - Per Cardiology cardiogenic syncope is unlikely as patient had normal PET myocarial perfusion scan in 2018  Neurology evaluated , started on keppra 500mg po bid   EEG Is a borderline abnormal study only due to the presence of  occasional bilateral frontotemporal slowing. It is a nonspecific finding.  There are no seizure discharges a localizing features  MRA Brain pending   Mri Brain. pending   Carotid dopplers no hemodynamically significant stenosis.   Pt eval pending    > Essential hypertension.  Plan: Diovan 40mg po bid.     >Gastroesophageal reflux disease with esophagitis.  Plan: zantac 150mg po qHS  Protonix 40mg po daily AM.     > Mixed hyperlipidemia.  Plan: Pravastatin 20mg po daily.     >Allergic state, subsequent encounter.  Plan: Singulair 10mg po qhs.     >DVT PPX - Lovenox

## 2019-01-29 NOTE — DISCHARGE NOTE ADULT - HOSPITAL COURSE
78 yr female with history of hypertension presenting with seizure. Seen by cardiology and Neurology, Per Cardiology cardiogenic syncope is unlikely as patient had normal PET myocarial perfusion scan in 2018, per Neurology started on Keppra. Pt had MRI and MRA brain no acute finding, discussed with Dr. Rjei yeh to discharge home on Keppra. Pt was seen by PT- cleared. Discussed with  present bedside, updated      A/P    >: New onset seizure.  Plan: Syncope is a consideration - Per Cardiology cardiogenic syncope is unlikely as patient had normal PET myocarial perfusion scan in 2018  Neurology evaluated , started on keppra 500mg po bid   EEG Is a borderline abnormal study only due to the presence of  occasional bilateral frontotemporal slowing. It is a nonspecific finding.  There are no seizure discharges a localizing features  MRA Brain < from: MR Angio Head No Cont (01.29.19 @ 11:57) >  No evidence of intracranial aneurysm or hemodynamically significant   stenosis on MRA.     < end of copied text >    Mri Brain. < from: MR Head No Cont (01.29.19 @ 12:12) >    Cerebral atrophy. Scattered nonspecific foci of T2 prolongation in the   deep and subcortical distribution suggestive of small vessel ischemic   changes.    < end of copied text >    Carotid dopplers no hemodynamically significant stenosis.   Pt eval pending    > Essential hypertension.  Plan: Diovan 40mg po bid.     >Gastroesophageal reflux disease with esophagitis.  Plan: zantac 150mg po qHS  Protonix 40mg po daily AM.     > Mixed hyperlipidemia.  Plan: Pravastatin 20mg po daily.     time spent 42  minutes

## 2019-01-29 NOTE — DISCHARGE NOTE ADULT - CARE PROVIDER_API CALL
Santana Mendoza), Neurology  [address] [place] [address]  Phone: [phone]  Fax: [phone]    Ladinsky, Michael A (), Family Medicine; Geriatric Medicine  [address] [place] [address]  Phone: [phone]  Fax: [phone]

## 2019-01-29 NOTE — PHYSICAL THERAPY INITIAL EVALUATION ADULT - DISCHARGE DISPOSITION, PT EVAL
home, no skilled inpatient or home care PT needs. Pt would benefit from continued outpatient PT for maintenance. However, pt will not be followed by PT at this time.

## 2019-01-29 NOTE — PROGRESS NOTE ADULT - ATTENDING COMMENTS
"September 28, 2018      Paresh Yang  [address]  [place] [address]      Dear Parent or Guardian of Paresh Yang    We are writing to inform you of your child's test results.    Paresh's urine test showed a trace of protein.  This can be normal, but since he had the history of the kidney problem in the past we should probably repeat it in a few weeks.  I will put in an order for a urine test. You can make a \"lab only\" appointment and come in and leave a sample in a month or so.         Resulted Orders   UA reflex to Microscopic and Culture   Result Value Ref Range    Color Urine Yellow     Appearance Urine Clear     Glucose Urine Negative NEG^Negative mg/dL    Bilirubin Urine Negative NEG^Negative    Ketones Urine Trace (A) NEG^Negative mg/dL    Specific Gravity Urine 1.025 1.003 - 1.035    Blood Urine Negative NEG^Negative    pH Urine 6.5 5.0 - 7.0 pH    Protein Albumin Urine 30 (A) NEG^Negative mg/dL    Urobilinogen Urine 0.2 0.2 - 1.0 EU/dL    Nitrite Urine Negative NEG^Negative    Leukocyte Esterase Urine Negative NEG^Negative    Source Midstream Urine    Urine Microscopic   Result Value Ref Range    WBC Urine 0 - 5 OTO5^0 - 5 /HPF    RBC Urine 2-5 (A) OTO2^O - 2 /HPF    Squamous Epithelial /LPF Urine Few FEW^Few /LPF    Bacteria Urine Few (A) NEG^Negative /HPF       If you have any questions or concerns, please call the clinic at the number listed above.     Sincerely,  Maddison Renae MD    " discussed with her  present bedside, updated

## 2019-01-29 NOTE — PROGRESS NOTE ADULT - SUBJECTIVE AND OBJECTIVE BOX
Internal Medicine Hospitalist - Dr. Watson    GIA JUANCANDIMERARI    [id number]    78y      Female    Patient is a 78y old  Female who presents with a chief complaint of Syncope and collapse   Possible seizure (29 Jan 2019 10:20)    INTERVAL HPI/ OVERNIGHT EVENTS: Patient is seen and examined, denied headache, dizziness, abd. pain, nausea and vomiting    REVIEW OF SYSTEMS:    Denied fever, chills, abd. pain, nausea, vomiting, chest pain, SOB, headache, dizziness    PHYSICAL EXAM:    Vital Signs Last 24 Hrs  T(C): 36.9 (29 Jan 2019 04:41), Max: 37.6 (28 Jan 2019 11:29)  T(F): 98.4 (29 Jan 2019 04:41), Max: 99.6 (28 Jan 2019 11:29)  HR: 74 (29 Jan 2019 07:24) (66 - 79)  BP: 129/69 (29 Jan 2019 04:41) (116/70 - 133/71)  BP(mean): --  RR: 16 (28 Jan 2019 20:54) (16 - 18)  SpO2: 94% (29 Jan 2019 04:41) (94% - 97%)    GENERAL: NAD  CHEST/LUNG: CTA b/l   HEART: S1S2+ audible  ABDOMEN: Soft, Nontender, Nondistended; Bowel sounds present  EXTREMITIES:  no edema  CNS: AAO X 3, non focal  Psychiatry: normal mood    LABS:                        14.3   10.6  )-----------( 355      ( 28 Jan 2019 06:51 )             44.2     01-28    140  |  104  |  11.0  ----------------------------<  106  4.0   |  26.0  |  0.61    Ca    9.1      28 Jan 2019 06:51    TPro  7.2  /  Alb  4.1  /  TBili  0.5  /  DBili  x   /  AST  23  /  ALT  21  /  AlkPhos  103  01-27    PT/INR - ( 27 Jan 2019 11:51 )   PT: 10.6 sec;   INR: 0.92 ratio         PTT - ( 27 Jan 2019 11:51 )  PTT:26.9 sec        MEDICATIONS  (STANDING):  atorvastatin 10 milliGRAM(s) Oral at bedtime  docusate sodium 100 milliGRAM(s) Oral two times a day  enoxaparin Injectable 40 milliGRAM(s) SubCutaneous every 24 hours  famotidine    Tablet 20 milliGRAM(s) Oral daily  FIRST- Mouthwash  BLM 10 milliLiter(s) Swish and Spit three times a day  influenza   Vaccine 0.5 milliLiter(s) IntraMuscular once  levETIRAcetam 500 milliGRAM(s) Oral two times a day  losartan 25 milliGRAM(s) Oral two times a day  montelukast 10 milliGRAM(s) Oral at bedtime  pantoprazole    Tablet 40 milliGRAM(s) Oral before breakfast  polyethylene glycol 3350 17 Gram(s) Oral daily    MEDICATIONS  (PRN):  LORazepam   Injectable 2 milliGRAM(s) IntraMuscular every 6 hours PRN for seizure      RADIOLOGY & ADDITIONAL TEST    < from: EEG Awake and Asleep (01.28.19 @ 14:55) >  Is a borderline abnormal study only due to the presence of   occasional bilateral frontotemporal slowing. It is a nonspecific finding.   There are no seizure discharges a localizing features    < end of copied text >

## 2019-01-29 NOTE — DISCHARGE NOTE ADULT - PATIENT PORTAL LINK FT
You can access the [facility] Patient Portal, offered by [facility], by registering with the following website: [email or web address]

## 2019-01-29 NOTE — PROGRESS NOTE ADULT - REASON FOR ADMISSION
Syncope and collapse   Possible seizure
seizure

## 2019-02-05 ENCOUNTER — APPOINTMENT (OUTPATIENT)
Dept: DERMATOLOGY | Facility: CLINIC | Age: 79
End: 2019-02-05

## 2019-07-26 RX ORDER — LEVETIRACETAM 500 MG/1
500 TABLET, FILM COATED ORAL
Refills: 0 | Status: ACTIVE | COMMUNITY

## 2019-07-29 PROBLEM — K21.9 GASTRO-ESOPHAGEAL REFLUX DISEASE WITHOUT ESOPHAGITIS: Chronic | Status: ACTIVE | Noted: 2019-01-27

## 2019-07-29 PROBLEM — I10 ESSENTIAL (PRIMARY) HYPERTENSION: Chronic | Status: ACTIVE | Noted: 2019-01-27

## 2019-07-31 ENCOUNTER — OUTPATIENT (OUTPATIENT)
Dept: OUTPATIENT SERVICES | Facility: HOSPITAL | Age: 79
LOS: 1 days | End: 2019-07-31
Payer: MEDICARE

## 2019-07-31 ENCOUNTER — TRANSCRIPTION ENCOUNTER (OUTPATIENT)
Age: 79
End: 2019-07-31

## 2019-07-31 ENCOUNTER — APPOINTMENT (OUTPATIENT)
Dept: PULMONOLOGY | Facility: CLINIC | Age: 79
End: 2019-07-31

## 2019-07-31 VITALS
TEMPERATURE: 98 F | RESPIRATION RATE: 20 BRPM | OXYGEN SATURATION: 100 % | SYSTOLIC BLOOD PRESSURE: 146 MMHG | HEART RATE: 76 BPM | DIASTOLIC BLOOD PRESSURE: 70 MMHG

## 2019-07-31 VITALS
DIASTOLIC BLOOD PRESSURE: 67 MMHG | HEART RATE: 75 BPM | OXYGEN SATURATION: 95 % | RESPIRATION RATE: 18 BRPM | SYSTOLIC BLOOD PRESSURE: 118 MMHG

## 2019-07-31 DIAGNOSIS — R07.9 CHEST PAIN, UNSPECIFIED: ICD-10-CM

## 2019-07-31 LAB
ANION GAP SERPL CALC-SCNC: 12 MMOL/L — SIGNIFICANT CHANGE UP (ref 5–17)
APTT BLD: 28.3 SEC — SIGNIFICANT CHANGE UP (ref 27.5–36.3)
BUN SERPL-MCNC: 16 MG/DL — SIGNIFICANT CHANGE UP (ref 8–20)
CALCIUM SERPL-MCNC: 9.7 MG/DL — SIGNIFICANT CHANGE UP (ref 8.6–10.2)
CHLORIDE SERPL-SCNC: 96 MMOL/L — LOW (ref 98–107)
CO2 SERPL-SCNC: 25 MMOL/L — SIGNIFICANT CHANGE UP (ref 22–29)
CREAT SERPL-MCNC: 0.56 MG/DL — SIGNIFICANT CHANGE UP (ref 0.5–1.3)
GLUCOSE SERPL-MCNC: 110 MG/DL — SIGNIFICANT CHANGE UP (ref 70–115)
HCT VFR BLD CALC: 40.9 % — SIGNIFICANT CHANGE UP (ref 34.5–45)
HGB BLD-MCNC: 14.2 G/DL — SIGNIFICANT CHANGE UP (ref 11.5–15.5)
INR BLD: 1 RATIO — SIGNIFICANT CHANGE UP (ref 0.88–1.16)
MCHC RBC-ENTMCNC: 28.8 PG — SIGNIFICANT CHANGE UP (ref 27–34)
MCHC RBC-ENTMCNC: 34.7 GM/DL — SIGNIFICANT CHANGE UP (ref 32–36)
MCV RBC AUTO: 83 FL — SIGNIFICANT CHANGE UP (ref 80–100)
PLATELET # BLD AUTO: 378 K/UL — SIGNIFICANT CHANGE UP (ref 150–400)
POTASSIUM SERPL-MCNC: 3.8 MMOL/L — SIGNIFICANT CHANGE UP (ref 3.5–5.3)
POTASSIUM SERPL-SCNC: 3.8 MMOL/L — SIGNIFICANT CHANGE UP (ref 3.5–5.3)
PROTHROM AB SERPL-ACNC: 11.5 SEC — SIGNIFICANT CHANGE UP (ref 10–12.9)
RBC # BLD: 4.93 M/UL — SIGNIFICANT CHANGE UP (ref 3.8–5.2)
RBC # FLD: 13.6 % — SIGNIFICANT CHANGE UP (ref 10.3–14.5)
SODIUM SERPL-SCNC: 133 MMOL/L — LOW (ref 135–145)
WBC # BLD: 9.17 K/UL — SIGNIFICANT CHANGE UP (ref 3.8–10.5)
WBC # FLD AUTO: 9.17 K/UL — SIGNIFICANT CHANGE UP (ref 3.8–10.5)

## 2019-07-31 PROCEDURE — 85730 THROMBOPLASTIN TIME PARTIAL: CPT

## 2019-07-31 PROCEDURE — 93010 ELECTROCARDIOGRAM REPORT: CPT

## 2019-07-31 PROCEDURE — 93567 NJX CAR CTH SPRVLV AORTGRPHY: CPT

## 2019-07-31 PROCEDURE — 36415 COLL VENOUS BLD VENIPUNCTURE: CPT

## 2019-07-31 PROCEDURE — 80048 BASIC METABOLIC PNL TOTAL CA: CPT

## 2019-07-31 PROCEDURE — 85027 COMPLETE CBC AUTOMATED: CPT

## 2019-07-31 PROCEDURE — C1894: CPT

## 2019-07-31 PROCEDURE — 93005 ELECTROCARDIOGRAM TRACING: CPT

## 2019-07-31 PROCEDURE — 93458 L HRT ARTERY/VENTRICLE ANGIO: CPT

## 2019-07-31 PROCEDURE — C1887: CPT

## 2019-07-31 PROCEDURE — C1760: CPT

## 2019-07-31 PROCEDURE — 85610 PROTHROMBIN TIME: CPT

## 2019-07-31 RX ORDER — VALSARTAN 80 MG/1
1 TABLET ORAL
Qty: 0 | Refills: 0 | DISCHARGE

## 2019-07-31 RX ORDER — PANTOPRAZOLE SODIUM 20 MG/1
1 TABLET, DELAYED RELEASE ORAL
Qty: 0 | Refills: 0 | DISCHARGE

## 2019-07-31 RX ORDER — AZELASTINE 137 UG/1
2 SPRAY, METERED NASAL
Qty: 0 | Refills: 0 | DISCHARGE

## 2019-07-31 RX ORDER — MONTELUKAST 4 MG/1
1 TABLET, CHEWABLE ORAL
Qty: 0 | Refills: 0 | DISCHARGE

## 2019-07-31 RX ORDER — RANITIDINE HYDROCHLORIDE 150 MG/1
1 TABLET, FILM COATED ORAL
Qty: 0 | Refills: 0 | DISCHARGE

## 2019-07-31 RX ORDER — FLUTICASONE PROPIONATE AND SALMETEROL 50; 250 UG/1; UG/1
1 POWDER ORAL; RESPIRATORY (INHALATION)
Qty: 0 | Refills: 0 | DISCHARGE

## 2019-07-31 NOTE — H&P PST ADULT - HISTORY OF PRESENT ILLNESS
This is a 80 yo female with prior history of Asthma well controlled by Dr Garcia, prior cath in 2010 which was normal, newly diagnosed siezure disorder on Keppra from Dr Mendoza.  She has has intermittent chest pain with rest and activity. Pharmacological NST was abnormal with a normal EF of 70%.  She is a non smoker asn is active with aerobic classes 4 X week.   she presents for a [facility] for ischemic evaluation.

## 2019-07-31 NOTE — H&P PST ADULT - NSICDXFAMILYHX_GEN_ALL_CORE_FT
FAMILY HISTORY:  Father  Still living? Unknown  Family history of diabetes mellitus in father, Age at diagnosis: Age Unknown    Mother  Still living? No  Family history of acute myocardial infarction, Age at diagnosis: Age Unknown  Family history of cerebrovascular accident (CVA) in mother, Age at diagnosis: Age Unknown

## 2019-07-31 NOTE — DISCHARGE NOTE PROVIDER - NSDCCPCAREPLAN_GEN_ALL_CORE_FT
PRINCIPAL DISCHARGE DIAGNOSIS  Diagnosis: Arteriosclerotic heart disease (ASHD)  Assessment and Plan of Treatment:

## 2019-07-31 NOTE — DISCHARGE NOTE PROVIDER - CARE PROVIDER_API CALL
Villa Crow)  Cardiovascular Disease; Interventional Cardiology  [address] [place] [address]  Phone: [phone]  Fax: [phone]  Follow Up Time:

## 2019-07-31 NOTE — DISCHARGE NOTE NURSING/CASE MANAGEMENT/SOCIAL WORK - NSDCDPATPORTLINK_GEN_ALL_CORE
You can access the [facility] Patient [facility], offered by [facility], by registering with the following website: [email or web address]

## 2019-07-31 NOTE — H&P PST ADULT - ASSESSMENT
Plan: PRE-PROCEDURE ASSESSMENT    -Patient seen and examined  -Labs reviewed  -Pre-procedure teaching completed with patient   -Questions answered about patients concerns     -instructed to NPO after midnight.   - Pt instructed to have escort to and from procedure

## 2019-08-06 ENCOUNTER — FORM ENCOUNTER (OUTPATIENT)
Age: 79
End: 2019-08-06

## 2019-08-07 ENCOUNTER — OUTPATIENT (OUTPATIENT)
Dept: OUTPATIENT SERVICES | Facility: HOSPITAL | Age: 79
LOS: 1 days | End: 2019-08-07
Payer: MEDICARE

## 2019-08-07 ENCOUNTER — APPOINTMENT (OUTPATIENT)
Dept: ULTRASOUND IMAGING | Facility: CLINIC | Age: 79
End: 2019-08-07
Payer: MEDICARE

## 2019-08-07 ENCOUNTER — APPOINTMENT (OUTPATIENT)
Dept: PULMONOLOGY | Facility: CLINIC | Age: 79
End: 2019-08-07
Payer: MEDICARE

## 2019-08-07 ENCOUNTER — APPOINTMENT (OUTPATIENT)
Dept: RADIOLOGY | Facility: CLINIC | Age: 79
End: 2019-08-07
Payer: MEDICARE

## 2019-08-07 VITALS
HEART RATE: 64 BPM | HEIGHT: 59 IN | BODY MASS INDEX: 23.99 KG/M2 | SYSTOLIC BLOOD PRESSURE: 120 MMHG | WEIGHT: 119 LBS | DIASTOLIC BLOOD PRESSURE: 64 MMHG | OXYGEN SATURATION: 98 %

## 2019-08-07 DIAGNOSIS — R06.00 DYSPNEA, UNSPECIFIED: ICD-10-CM

## 2019-08-07 LAB — DEPRECATED D DIMER PPP IA-ACNC: <150 NG/ML DDU

## 2019-08-07 PROCEDURE — 93970 EXTREMITY STUDY: CPT | Mod: 26

## 2019-08-07 PROCEDURE — 94010 BREATHING CAPACITY TEST: CPT

## 2019-08-07 PROCEDURE — 93970 EXTREMITY STUDY: CPT

## 2019-08-07 PROCEDURE — 71046 X-RAY EXAM CHEST 2 VIEWS: CPT | Mod: 26

## 2019-08-07 PROCEDURE — 71046 X-RAY EXAM CHEST 2 VIEWS: CPT

## 2019-08-07 PROCEDURE — 99215 OFFICE O/P EST HI 40 MIN: CPT | Mod: 25

## 2019-08-07 NOTE — PHYSICAL EXAM
[General Appearance - Well Developed] : well developed [Normal Appearance] : normal appearance [No Deformities] : no deformities [General Appearance - Well Nourished] : well nourished [Heart Sounds] : normal S1 and S2 [Heart Rate And Rhythm] : heart rate and rhythm were normal [Murmurs] : no murmurs present [Edema] : no peripheral edema present [Respiration, Rhythm And Depth] : normal respiratory rhythm and effort [Exaggerated Use Of Accessory Muscles For Inspiration] : no accessory muscle use [Auscultation Breath Sounds / Voice Sounds] : lungs were clear to auscultation bilaterally [Abnormal Walk] : normal gait [] : no rash [No Focal Deficits] : no focal deficits [Oriented To Time, Place, And Person] : oriented to person, place, and time [Impaired Insight] : insight and judgment were intact [Affect] : the affect was normal [Nail Clubbing] : no clubbing of the fingernails [Memory Recent] : recent memory was not impaired [Cyanosis, Localized] : no localized cyanosis [FreeTextEntry1] : no chest wall abn

## 2019-08-07 NOTE — DISCUSSION/SUMMARY
[FreeTextEntry1] : asthma by hx, recent catheterization without need for intervention\par  spirometry is normal and actually improved, exam without bronchospasm, normal sp02\par Dyspnea is disproportionate, very anxious during visit which i suspect is playing a role\par will obtain stat CXR, D Dimer, duplex, CTA if positive, no thrombophilic risk factors, suspicion not high\par continue advair at this time\par Ct chest 2018 with stable nodules x yrs \par vaccinations this fall \par 6-months or sooner if needed, will contact earlier with results

## 2019-08-07 NOTE — REVIEW OF SYSTEMS
[As Noted in HPI] : as noted in HPI [Negative] : Cardiovascular [de-identified] : sees allergist [FreeTextEntry9] : see Cardiologist/ chest pain work up negative

## 2019-08-07 NOTE — CONSULT LETTER
[Dear  ___] : Dear  [unfilled], [Consult Letter:] : I had the pleasure of evaluating your patient, [unfilled]. [Consult Closing:] : Thank you very much for allowing me to participate in the care of this patient.  If you have any questions, please do not hesitate to contact me. [Please see my note below.] : Please see my note below. [Sincerely,] : Sincerely, [DrDipti  ___] : Dr. BAINS [DrDipti ___] : Dr. BAINS [[facility], [facility]] : [facility] [Director, Respiratory Care] : Director, Respiratory Care [[facility]] : [facility] [FreeTextEntry3] : [facility]\par Pulmonary Critical Care\par Director Pulmonary Division\par Medical Director Respiratory Therapy\par [facility]\par \par

## 2019-08-07 NOTE — PROCEDURE
[FreeTextEntry1] : spirometry remains normal , actually improved from 2018\par CT 3/18: stable nodules , no change from 2010

## 2019-08-07 NOTE — HISTORY OF PRESENT ILLNESS
[FreeTextEntry1] : worsening dyspnea x 2 months with cough\par had recent catheterization, no blockages per pt\par BP and heart rate meds were adjusted\par uses advair  and singulair\par \par

## 2019-09-12 ENCOUNTER — APPOINTMENT (OUTPATIENT)
Dept: DERMATOLOGY | Facility: CLINIC | Age: 79
End: 2019-09-12

## 2020-02-11 ENCOUNTER — APPOINTMENT (OUTPATIENT)
Dept: PULMONOLOGY | Facility: CLINIC | Age: 80
End: 2020-02-11
Payer: MEDICARE

## 2020-02-11 ENCOUNTER — FORM ENCOUNTER (OUTPATIENT)
Age: 80
End: 2020-02-11

## 2020-02-11 VITALS
HEART RATE: 77 BPM | DIASTOLIC BLOOD PRESSURE: 76 MMHG | OXYGEN SATURATION: 97 % | HEIGHT: 59 IN | SYSTOLIC BLOOD PRESSURE: 122 MMHG | BODY MASS INDEX: 24.19 KG/M2 | WEIGHT: 120 LBS

## 2020-02-11 DIAGNOSIS — T78.40XA ALLERGY, UNSPECIFIED, INITIAL ENCOUNTER: ICD-10-CM

## 2020-02-11 DIAGNOSIS — R91.8 OTHER NONSPECIFIC ABNORMAL FINDING OF LUNG FIELD: ICD-10-CM

## 2020-02-11 PROCEDURE — 99215 OFFICE O/P EST HI 40 MIN: CPT | Mod: 25

## 2020-02-11 PROCEDURE — 94010 BREATHING CAPACITY TEST: CPT

## 2020-02-11 NOTE — REVIEW OF SYSTEMS
[As Noted in HPI] : as noted in HPI [Negative] : Cardiovascular [FreeTextEntry9] : see Cardiologist/ chest pain work up negative [de-identified] : sees allergist

## 2020-02-11 NOTE — CONSULT LETTER
[Dear  ___] : Dear  [unfilled], [Consult Letter:] : I had the pleasure of evaluating your patient, [unfilled]. [Please see my note below.] : Please see my note below. [Sincerely,] : Sincerely, [Consult Closing:] : Thank you very much for allowing me to participate in the care of this patient.  If you have any questions, please do not hesitate to contact me. [DrDipti  ___] : Dr. BAINS [DrDipti ___] : Dr. BAINS [Director, Respiratory Care] : Director, Respiratory Care [Lance James DO, [facility]] : [facility] [[facility]] : [facility] [FreeTextEntry3] : [facility]\par Pulmonary Critical Care\par Director Pulmonary Division\par Medical Director Respiratory Therapy\par [facility]\par \par

## 2020-02-11 NOTE — HISTORY OF PRESENT ILLNESS
[TextBox_4] : chronic exertional dyspnea\par has reflux and rhinitis\par no fever, chill, no sig chest pain\par uses advair bid   and singulair intermittently [FreeTextEntry1] : \par \par

## 2020-02-11 NOTE — DISCUSSION/SUMMARY
[FreeTextEntry1] : asthma by hx, recent catheterization without need for intervention\par  exam without bronchospasm, normal sp02\par Dyspnea is disproportionate, very anxious during visit which i suspect is playing a role\par spirometry remains normal, will repeat CXR\par continue advair at this time\par Ct chest 2018 with stable nodules x yrs \par vaccinations this winter\par 6-months or sooner if needed, will contact earlier with results

## 2020-02-11 NOTE — PHYSICAL EXAM
[Normal Appearance] : normal appearance [General Appearance - Well Developed] : well developed [No Deformities] : no deformities [General Appearance - Well Nourished] : well nourished [Heart Rate And Rhythm] : heart rate and rhythm were normal [Heart Sounds] : normal S1 and S2 [Murmurs] : no murmurs present [Edema] : no peripheral edema present [Exaggerated Use Of Accessory Muscles For Inspiration] : no accessory muscle use [Auscultation Breath Sounds / Voice Sounds] : lungs were clear to auscultation bilaterally [Respiration, Rhythm And Depth] : normal respiratory rhythm and effort [] : no rash [Abnormal Walk] : normal gait [Impaired Insight] : insight and judgment were intact [No Focal Deficits] : no focal deficits [Oriented To Time, Place, And Person] : oriented to person, place, and time [Memory Recent] : recent memory was not impaired [Affect] : the affect was normal [Nail Clubbing] : no clubbing of the fingernails [Cyanosis, Localized] : no localized cyanosis [FreeTextEntry1] : no chest wall abn

## 2020-02-12 ENCOUNTER — OUTPATIENT (OUTPATIENT)
Dept: OUTPATIENT SERVICES | Facility: HOSPITAL | Age: 80
LOS: 1 days | End: 2020-02-12
Payer: MEDICARE

## 2020-02-12 ENCOUNTER — APPOINTMENT (OUTPATIENT)
Dept: RADIOLOGY | Facility: CLINIC | Age: 80
End: 2020-02-12

## 2020-02-12 DIAGNOSIS — Z00.00 ENCOUNTER FOR GENERAL ADULT MEDICAL EXAMINATION WITHOUT ABNORMAL FINDINGS: ICD-10-CM

## 2020-02-12 PROCEDURE — 71046 X-RAY EXAM CHEST 2 VIEWS: CPT

## 2020-02-12 PROCEDURE — 71046 X-RAY EXAM CHEST 2 VIEWS: CPT | Mod: 26

## 2020-08-12 ENCOUNTER — APPOINTMENT (OUTPATIENT)
Dept: DERMATOLOGY | Facility: CLINIC | Age: 80
End: 2020-08-12
Payer: MEDICARE

## 2020-08-12 PROCEDURE — 99215 OFFICE O/P EST HI 40 MIN: CPT

## 2020-08-17 ENCOUNTER — APPOINTMENT (OUTPATIENT)
Dept: PULMONOLOGY | Facility: CLINIC | Age: 80
End: 2020-08-17
Payer: MEDICARE

## 2020-08-17 VITALS
BODY MASS INDEX: 23.09 KG/M2 | DIASTOLIC BLOOD PRESSURE: 70 MMHG | HEART RATE: 83 BPM | WEIGHT: 110 LBS | HEIGHT: 58 IN | OXYGEN SATURATION: 97 % | SYSTOLIC BLOOD PRESSURE: 110 MMHG

## 2020-08-17 PROCEDURE — 99214 OFFICE O/P EST MOD 30 MIN: CPT

## 2020-08-17 NOTE — CONSULT LETTER
[Consult Letter:] : I had the pleasure of evaluating your patient, [unfilled]. [Dear  ___] : Dear  [unfilled], [Please see my note below.] : Please see my note below. [Consult Closing:] : Thank you very much for allowing me to participate in the care of this patient.  If you have any questions, please do not hesitate to contact me. [Sincerely,] : Sincerely, [DrDipti  ___] : Dr. BAINS [FreeTextEntry3] : Lance [facility] DO [facility]\par Pulmonary Critical Care\par Director Pulmonary Division\par Medical Director Respiratory Therapy\par [facility]\par \par  [[facility] James DO, [facility]] : Lance Jaems DO, [facility] [DrDipti ___] : Dr. BAINS [[facility]] : [facility] [Director, Respiratory Care] : Director, Respiratory Care

## 2020-08-17 NOTE — REVIEW OF SYSTEMS
[As Noted in HPI] : as noted in HPI [Negative] : Cardiovascular [FreeTextEntry9] : see Cardiologist/ chest pain work up negative [de-identified] : sees allergist

## 2020-08-17 NOTE — DISCUSSION/SUMMARY
[FreeTextEntry1] : asthma by hx, recent catheterization without need for intervention\par  exam without bronchospasm, normal sp02\par Dyspnea is better when walking, pt feels it is anxiety\par last spirometry was normal, CXR 2/20 stable \par continue advair at this time\par Ct chest 2018 with stable nodules x yrs \par vaccinations this winter\par 6-months or sooner if needed, will contact earlier with results

## 2020-08-17 NOTE — HISTORY OF PRESENT ILLNESS
[TextBox_4] : chronic exertional dyspnea\par walk regularly\par has reflux and rhinitis\par no fever, chill, no sig chest pain\par uses advair bid   and singulair intermittently [FreeTextEntry1] : \par \par

## 2020-08-17 NOTE — PHYSICAL EXAM
[General Appearance - Well Developed] : well developed [Normal Appearance] : normal appearance [General Appearance - Well Nourished] : well nourished [No Deformities] : no deformities [Heart Rate And Rhythm] : heart rate and rhythm were normal [Heart Sounds] : normal S1 and S2 [Murmurs] : no murmurs present [Edema] : no peripheral edema present [Auscultation Breath Sounds / Voice Sounds] : lungs were clear to auscultation bilaterally [Exaggerated Use Of Accessory Muscles For Inspiration] : no accessory muscle use [Respiration, Rhythm And Depth] : normal respiratory rhythm and effort [Abnormal Walk] : normal gait [FreeTextEntry1] : no chest wall abn [] : no rash [No Focal Deficits] : no focal deficits [Oriented To Time, Place, And Person] : oriented to person, place, and time [Affect] : the affect was normal [Impaired Insight] : insight and judgment were intact [Memory Recent] : recent memory was not impaired [Nail Clubbing] : no clubbing of the fingernails [Cyanosis, Localized] : no localized cyanosis

## 2020-08-24 NOTE — ED ADULT TRIAGE NOTE - TEMPERATURE IN FAHRENHEIT (DEGREES F)
AOX3-VSS-O2 sats >92% RA- Good PO intake, Denies c/o- Discharge instructions by phone per Hospital COVID Protocol- Pt and responsible adult verbalize understanding of discharge instructions, denies questions. Up in W/C - transported to door for discharge to home.    98.2

## 2020-09-23 NOTE — H&P ADULT - REASON FOR ADMISSION
Syncope and collapse   Possible seizure [Follow-Up - Clinic] : a clinic follow-up of [FreeTextEntry2] : [facility]i

## 2021-02-02 ENCOUNTER — APPOINTMENT (OUTPATIENT)
Dept: PULMONOLOGY | Facility: CLINIC | Age: 81
End: 2021-02-02

## 2021-02-08 ENCOUNTER — APPOINTMENT (OUTPATIENT)
Dept: PULMONOLOGY | Facility: CLINIC | Age: 81
End: 2021-02-08

## 2021-02-13 DIAGNOSIS — Z01.818 ENCOUNTER FOR OTHER PREPROCEDURAL EXAMINATION: ICD-10-CM

## 2021-02-14 ENCOUNTER — APPOINTMENT (OUTPATIENT)
Dept: DISASTER EMERGENCY | Facility: CLINIC | Age: 81
End: 2021-02-14

## 2021-02-15 LAB — SARS-COV-2 N GENE NPH QL NAA+PROBE: NOT DETECTED

## 2021-02-17 ENCOUNTER — APPOINTMENT (OUTPATIENT)
Dept: RADIOLOGY | Facility: CLINIC | Age: 81
End: 2021-02-17
Payer: MEDICARE

## 2021-02-17 ENCOUNTER — APPOINTMENT (OUTPATIENT)
Dept: PULMONOLOGY | Facility: CLINIC | Age: 81
End: 2021-02-17
Payer: MEDICARE

## 2021-02-17 ENCOUNTER — OUTPATIENT (OUTPATIENT)
Dept: OUTPATIENT SERVICES | Facility: HOSPITAL | Age: 81
LOS: 1 days | End: 2021-02-17
Payer: MEDICARE

## 2021-02-17 VITALS — HEIGHT: 58 IN | WEIGHT: 110 LBS | TEMPERATURE: 98.1 F | BODY MASS INDEX: 23.09 KG/M2

## 2021-02-17 VITALS
OXYGEN SATURATION: 97 % | HEART RATE: 94 BPM | DIASTOLIC BLOOD PRESSURE: 68 MMHG | RESPIRATION RATE: 14 BRPM | SYSTOLIC BLOOD PRESSURE: 122 MMHG

## 2021-02-17 DIAGNOSIS — R06.00 DYSPNEA, UNSPECIFIED: ICD-10-CM

## 2021-02-17 PROCEDURE — 99214 OFFICE O/P EST MOD 30 MIN: CPT

## 2021-02-17 PROCEDURE — 71046 X-RAY EXAM CHEST 2 VIEWS: CPT | Mod: 26

## 2021-02-17 PROCEDURE — 71046 X-RAY EXAM CHEST 2 VIEWS: CPT

## 2021-02-17 RX ORDER — OFLOXACIN OTIC 3 MG/ML
0.3 SOLUTION AURICULAR (OTIC)
Qty: 10 | Refills: 0 | Status: ACTIVE | COMMUNITY
Start: 2020-09-11

## 2021-02-17 RX ORDER — HYDROCHLOROTHIAZIDE 12.5 MG/1
12.5 CAPSULE ORAL
Qty: 90 | Refills: 0 | Status: ACTIVE | COMMUNITY
Start: 2020-12-01

## 2021-02-17 NOTE — CONSULT LETTER
[Dear  ___] : Dear  [unfilled], [Consult Letter:] : I had the pleasure of evaluating your patient, [unfilled]. [Please see my note below.] : Please see my note below. [Consult Closing:] : Thank you very much for allowing me to participate in the care of this patient.  If you have any questions, please do not hesitate to contact me. [Sincerely,] : Sincerely, [FreeTextEntry3] : [facility] DO [facility]\par Pulmonary Critical Care\par Director Pulmonary Division\par Medical Director Respiratory Therapy\par [facility]\par \par  [DrDipti  ___] : Dr. BAINS [DrDipti ___] : Dr. BAINS [[facility]] : [facility], [facility] [Director, Respiratory Care] : Director, Respiratory Care [[facility]] : [facility]

## 2021-02-17 NOTE — PHYSICAL EXAM
[General Appearance - Well Developed] : well developed [Normal Appearance] : normal appearance [General Appearance - Well Nourished] : well nourished [No Deformities] : no deformities [Heart Rate And Rhythm] : heart rate and rhythm were normal [Heart Sounds] : normal S1 and S2 [Murmurs] : no murmurs present [Edema] : no peripheral edema present [Respiration, Rhythm And Depth] : normal respiratory rhythm and effort [Exaggerated Use Of Accessory Muscles For Inspiration] : no accessory muscle use [Auscultation Breath Sounds / Voice Sounds] : lungs were clear to auscultation bilaterally [FreeTextEntry1] : no chest wall abn [Abnormal Walk] : normal gait [] : no rash [No Focal Deficits] : no focal deficits [Oriented To Time, Place, And Person] : oriented to person, place, and time [Impaired Insight] : insight and judgment were intact [Affect] : the affect was normal [Memory Recent] : recent memory was not impaired [Nail Clubbing] : no clubbing of the fingernails [Cyanosis, Localized] : no localized cyanosis

## 2021-02-17 NOTE — DISCUSSION/SUMMARY
[FreeTextEntry1] : asthma by hx, recent catheterization without need for intervention\par  exam without bronchospasm, normal sp02\par Dyspnea is better when walking, pt feels it is anxiety\par spirometry remains super normal, \par CXR 2/20 stable, will repeat  \par continue prn rescue only\par Ct chest 2018 with stable nodules x yrs \par vaccinations this winter\par Cardiology follow up\par Discussed psychiatric evaluation fro anxiety\par 6-months or sooner if needed, will contact earlier with results

## 2021-02-17 NOTE — REVIEW OF SYSTEMS
[As Noted in HPI] : as noted in HPI [Negative] : Cardiovascular [FreeTextEntry9] : see Cardiologist/ chest pain work up negative [de-identified] : sees allergist

## 2021-02-25 ENCOUNTER — NON-APPOINTMENT (OUTPATIENT)
Age: 81
End: 2021-02-25

## 2021-08-17 ENCOUNTER — APPOINTMENT (OUTPATIENT)
Dept: PULMONOLOGY | Facility: CLINIC | Age: 81
End: 2021-08-17
Payer: MEDICARE

## 2021-08-17 ENCOUNTER — APPOINTMENT (OUTPATIENT)
Dept: DERMATOLOGY | Facility: CLINIC | Age: 81
End: 2021-08-17

## 2021-08-17 VITALS
HEART RATE: 76 BPM | DIASTOLIC BLOOD PRESSURE: 60 MMHG | OXYGEN SATURATION: 96 % | WEIGHT: 107 LBS | SYSTOLIC BLOOD PRESSURE: 115 MMHG | BODY MASS INDEX: 22.36 KG/M2 | RESPIRATION RATE: 16 BRPM

## 2021-08-17 PROCEDURE — 99213 OFFICE O/P EST LOW 20 MIN: CPT

## 2021-08-17 NOTE — REVIEW OF SYSTEMS
[As Noted in HPI] : as noted in HPI [Negative] : Cardiovascular [FreeTextEntry9] : see Cardiologist/ chest pain work up negative [de-identified] : sees allergist

## 2021-08-17 NOTE — DISCUSSION/SUMMARY
[FreeTextEntry1] : asthma by hx\par  exam without bronchospasm, normal sp02\par No sig anxiety\par spirometry remains super normal 2/21\par CXR 2/21\par continue prn rescue only\par Ct chest 2018 with stable nodules x yrs \par Had Covid vaccine\par Cardiology follow up\par 6-months or sooner if needed

## 2021-08-17 NOTE — CONSULT LETTER
[Dear  ___] : Dear  [unfilled], [Consult Letter:] : I had the pleasure of evaluating your patient, [unfilled]. [Please see my note below.] : Please see my note below. [Consult Closing:] : Thank you very much for allowing me to participate in the care of this patient.  If you have any questions, please do not hesitate to contact me. [Sincerely,] : Sincerely, [FreeTextEntry3] : [facility] DO [facility]\par Pul[facility] Critical Care\par Director Pulmonary Division\par Medical Director Respiratory Therapy\par [facility]\par \par  [DrDipti  ___] : Dr. BAINS [DrDipti ___] : Dr. BAINS [[facility], [facility]] : [facility], [facility] [Director, Respiratory Care] : Director, Respiratory Care [[facility]] : [facility]

## 2021-08-18 ENCOUNTER — OUTPATIENT (OUTPATIENT)
Dept: OUTPATIENT SERVICES | Facility: HOSPITAL | Age: 81
LOS: 1 days | End: 2021-08-18
Payer: MEDICARE

## 2021-08-18 ENCOUNTER — APPOINTMENT (OUTPATIENT)
Dept: ULTRASOUND IMAGING | Facility: CLINIC | Age: 81
End: 2021-08-18
Payer: MEDICARE

## 2021-08-18 DIAGNOSIS — R06.00 DYSPNEA, UNSPECIFIED: ICD-10-CM

## 2021-08-18 PROCEDURE — 93970 EXTREMITY STUDY: CPT | Mod: 26

## 2021-08-18 PROCEDURE — 93970 EXTREMITY STUDY: CPT

## 2021-11-20 NOTE — DISCHARGE NOTE PROVIDER - NSDCQMERRANDS_GEN_ALL_CORE
Problem: Hemodialysis  Goal: Fistula/graft intact as evidenced by presence of bruit & thrill  Outcome: Outcome Met, Continue evaluating goal progress toward completion  Note: Graft on RUE w/o s/s of infection, -450 ml/min w/o events, at end all blood returned, needles removed, prolong bleeding from  both sides due to Hep and cont   Goal: Dialysis: Safe, effective, and comfortable hemodialysis treatment (Hemodialysis nurse only)  Outcome: Outcome Met, Continue evaluating goal progress toward completion  Note: 3.5 hours of HD ended w/o events, all blood returned, removed 2500 ml fluids, 12.5 grams of albumin given due to asympt hypotension episode, half way thru tx pt verbalized CP, Trop  and EKG done, pt in A-fib, when pt changed position from Semi-Posada to sitting all CP ended, at the end all blood returned, needles removed, hemostasis applied by staff, prolong bleeding from both sides due to heparin IV, bruit and thrill present pre/post HD. Consent signed by pt and Dr Иван MONTES DE OCA in the justino folder by the pt room.  Report given to floor RN      No

## 2022-04-19 ENCOUNTER — APPOINTMENT (OUTPATIENT)
Dept: PULMONOLOGY | Facility: CLINIC | Age: 82
End: 2022-04-19
Payer: MEDICARE

## 2022-04-19 VITALS — HEART RATE: 84 BPM | OXYGEN SATURATION: 96 % | RESPIRATION RATE: 16 BRPM

## 2022-04-19 VITALS — BODY MASS INDEX: 23.62 KG/M2 | WEIGHT: 113 LBS

## 2022-04-19 DIAGNOSIS — R06.00 DYSPNEA, UNSPECIFIED: ICD-10-CM

## 2022-04-19 PROCEDURE — 99213 OFFICE O/P EST LOW 20 MIN: CPT

## 2022-04-19 NOTE — CONSULT LETTER
[Dear  ___] : Dear  [unfilled], [Consult Letter:] : I had the pleasure of evaluating your patient, [unfilled]. [Please see my note below.] : Please see my note below. [Consult Closing:] : Thank you very much for allowing me to participate in the care of this patient.  If you have any questions, please do not hesitate to contact me. [Sincerely,] : Sincerely, [DrDipti  ___] : Dr. BAINS [DrDipti ___] : Dr. BAINS [Lance James DO, [facility]] : Lance [facility] DO, [facility] [Director, Respiratory Care] : Director, Respiratory Care [[facility]] : [facility] [FreeTextEntry3] : [facility] DO [facility]\par Pulmonary Critical Care\par Director Pulmonary Division\par Medical Director Respiratory Therapy\par [facility]\par \par

## 2022-04-19 NOTE — DISCUSSION/SUMMARY
[FreeTextEntry1] : asthma by hx, followed by Allergist- on immunotherapy injections x years\par  exam without bronchospasm, normal sp02\par spirometry remains super normal 2/21\par continue prn rescue only\par Ct chest 2018 with stable nodules x yrs ,CXR 2/21\par Had Covid vaccine x3, 2nd booster discussed\par Cardiology follow up\par action plan reviewed\par 6-months or sooner if needed, repeat spirometry at follow up

## 2022-04-19 NOTE — PHYSICAL EXAM
[General Appearance - Well Developed] : well developed [Normal Appearance] : normal appearance [General Appearance - Well Nourished] : well nourished [No Deformities] : no deformities [Heart Rate And Rhythm] : heart rate and rhythm were normal [Heart Sounds] : normal S1 and S2 [Murmurs] : no murmurs present [Edema] : no peripheral edema present [Respiration, Rhythm And Depth] : normal respiratory rhythm and effort [Exaggerated Use Of Accessory Muscles For Inspiration] : no accessory muscle use [Auscultation Breath Sounds / Voice Sounds] : lungs were clear to auscultation bilaterally [Abnormal Walk] : normal gait [] : no rash [No Focal Deficits] : no focal deficits [Oriented To Time, Place, And Person] : oriented to person, place, and time [Impaired Insight] : insight and judgment were intact [Affect] : the affect was normal [Memory Recent] : recent memory was not impaired [Nail Clubbing] : no clubbing of the fingernails [Cyanosis, Localized] : no localized cyanosis [FreeTextEntry1] : no chest wall abn

## 2022-04-19 NOTE — REVIEW OF SYSTEMS
[As Noted in HPI] : as noted in HPI [Negative] : Cardiovascular [FreeTextEntry9] : see Cardiologist/ chest pain work up negative [de-identified] : sees allergist

## 2022-04-19 NOTE — HISTORY OF PRESENT ILLNESS
[TextBox_4] : chronic exertional dyspnea\par walk regularly\par has reflux and rhinitis\par no fever, chill, no sig chest pain\par uses Advair bid   and Singulair night time\par worsening in allergy season- mostly nasal congestion, on immunotherapy every other week\par also on Astelin, Flonase, Allegra [FreeTextEntry1] : \par \par

## 2022-04-19 NOTE — PROCEDURE
[FreeTextEntry1] : spirometry 2/21 remains normal , actually improved from 2018\par CT 3/18: stable nodules , no change from 2010\par CXR 2/21 no acute change

## 2022-04-20 ENCOUNTER — APPOINTMENT (OUTPATIENT)
Dept: RADIOLOGY | Facility: CLINIC | Age: 82
End: 2022-04-20

## 2022-04-20 ENCOUNTER — OUTPATIENT (OUTPATIENT)
Dept: OUTPATIENT SERVICES | Facility: HOSPITAL | Age: 82
LOS: 1 days | End: 2022-04-20
Payer: MEDICARE

## 2022-04-20 DIAGNOSIS — R05.9 COUGH, UNSPECIFIED: ICD-10-CM

## 2022-04-20 PROCEDURE — 71046 X-RAY EXAM CHEST 2 VIEWS: CPT | Mod: 26

## 2022-04-20 PROCEDURE — 71046 X-RAY EXAM CHEST 2 VIEWS: CPT

## 2022-04-22 ENCOUNTER — NON-APPOINTMENT (OUTPATIENT)
Age: 82
End: 2022-04-22

## 2022-06-25 NOTE — DISCHARGE NOTE PROVIDER - NSDCDCMDCOMP_GEN_ALL_CORE
AM assessment completed. Patient A&Ox4, pleasant and cooperative. NIHSS 0. No deficits noted on exam. Pt denies complaints. PIV in place, ns locked/capped. Ambulated to restroom with even, steady gait. Returned to bed, bed alarm on pt has call light. Awaiting MRi/MRa. Will continue to monitor. This document is complete and the patient is ready for discharge.

## 2022-08-11 NOTE — PHYSICAL THERAPY INITIAL EVALUATION ADULT - LEVEL OF INDEPENDENCE: SIT/STAND, REHAB EVAL
Patient seen and examined.  Agree with resident note.  Meds, labs and vitals all reviewed.  Patient with history of Parkinson's, with original presentation of sepsis, initially thought to be due to PNA, started on broad spectrum antibiotics.   However, further imaging with CTAP suggesting of colitis, duodenitis and possibility of gallbladder inflammation.  Since then, antibiotics tailored to GI source, now on Ceftriaxone and Flagyl. RUQ done this morning suggestive of possible gallstone and acute cholecystitis. LFT's are within normal limits. GI evaluation this morning. Surgery evaluated the patient yesterday.   NPO for the time being. Septic picture has resolved, with no further leukocytosis. Last fever was yesterday. Hemodynamically stable. Patient seen and examined. Discussed with [facility]B surgery. There is significant colon inflammation and potential for developing fistula. Surgical intervention based on imaging has a higher likelihood of need extensive intervention in this fairly debilitated patient. Currently he is without pain. He has no tenderness. He is eating and drinking. He does seem to be improving clinically with antibiotics. Although not definite surgery, if patient continues to improve without intervention, he may not need acute cholecystostomy or emergent surgery. Given the potential for underlying malignancy or fistula, recommend follow up with Dr. Olivier Pop - B surgery as outpatient should he be discharged in the coming days. ACS attending: Patient with acute decompensation last evening. Now transferred to surgical service. After extensive discussions by SICU staff with HCP and in line with patient wishes, they do not wish to pursue additional treatment given his frailty and quality of life. Appreciate palliative care. Pt was seen and examined with the team. Agree with the plan stated in fellow's note. Patient seen and examined. He has no abdominal pain. He has no tenderness. Has no abdominal complaints. Recommend continued supportive care as acute acalculous cholecystitis as well as colitis might be successfully managed with antibiotics. Exam and history currently not correlating. We will continue to follow with you. This patient ws evaluated as an ICU consult and admitted to the ICU, please refer to that not for the details, billing was done on that note. independent Sepsis  Gram negative Pneumonia  Acute Metabolic Encephalopathy    WILL       ceftriaxone and flagyl  check hida scan   f/u surgery recommendations Sepsis  Suspected Acute Cholecystitis   Acute infectious Colitis   Gram negative Pneumonia  Acute Metabolic Encephalopathy    WILL      HIDA Scan equivocal ,similar to HIDA from 2019    CT and USG consistent with Cholecystitis    Gen Surgery Recommend Perc Glo    However patient has improved significantly with Anbx , Tolerating Diet , Has no Signs of Sepsis , Blood cx negative .   Bilirubin , Alk Phos , Liver Chemistries within normal limits    Will discuss with IR if Perc Glo is really a necessity for this patient and will discuss with Patients HCP /Guardian Sepsis  Suspected Acute Acalculous Cholecystitis   Acute infectious Colitis   Gram negative Pneumonia  Acute Metabolic Encephalopathy    WILL      HIDA Scan equivocal ,similar to HIDA from 2019    CT and USG consistent with  Acalculous Cholecystitis    Gen Surgery Recommend Perc Glo    However patient has improved significantly with Anbx , Tolerating Diet , Has no Signs of Sepsis , Blood cx negative .   Bilirubin , Alk Phos , Liver Chemistries within normal limits   Discussed with IR , Given No signs of sepsis and normal Liver chemistry and patient being asymptomatic there is not need for acute cholecystostomy per IR, Surgery team also has the same recommendations  Patient is now a 2 person assist and will either need 2 aides at home or will need to go to [facility] see H and P

## 2022-08-15 ENCOUNTER — APPOINTMENT (OUTPATIENT)
Dept: OTOLARYNGOLOGY | Facility: CLINIC | Age: 82
End: 2022-08-15

## 2022-12-10 NOTE — ED ADULT NURSE NOTE - GASTROINTESTINAL WDL
Generalized weakness
Abdomen soft, nontender, nondistended, bowel sounds present in all 4 quadrants.

## 2022-12-14 ENCOUNTER — APPOINTMENT (OUTPATIENT)
Dept: PULMONOLOGY | Facility: CLINIC | Age: 82
End: 2022-12-14

## 2022-12-14 VITALS
DIASTOLIC BLOOD PRESSURE: 74 MMHG | SYSTOLIC BLOOD PRESSURE: 122 MMHG | RESPIRATION RATE: 14 BRPM | OXYGEN SATURATION: 98 % | WEIGHT: 106 LBS | BODY MASS INDEX: 22.15 KG/M2 | HEART RATE: 78 BPM

## 2022-12-14 PROCEDURE — 99213 OFFICE O/P EST LOW 20 MIN: CPT

## 2022-12-14 RX ORDER — CEFADROXIL 500 MG/1
500 CAPSULE ORAL
Qty: 14 | Refills: 0 | Status: DISCONTINUED | COMMUNITY
Start: 2020-10-01 | End: 2022-12-14

## 2022-12-14 RX ORDER — OSELTAMIVIR PHOSPHATE 75 MG/1
75 CAPSULE ORAL
Qty: 10 | Refills: 0 | Status: DISCONTINUED | COMMUNITY
Start: 2018-01-23 | End: 2022-12-14

## 2022-12-14 RX ORDER — METHYLPREDNISOLONE 4 MG/1
4 TABLET ORAL
Qty: 21 | Refills: 0 | Status: DISCONTINUED | COMMUNITY
Start: 2022-04-06 | End: 2022-12-14

## 2022-12-14 NOTE — REVIEW OF SYSTEMS
[As Noted in HPI] : as noted in HPI [Negative] : Cardiovascular [FreeTextEntry9] : see Cardiologist/ chest pain work up negative [de-identified] : sees allergist

## 2022-12-14 NOTE — CONSULT LETTER
[Dear  ___] : Dear  [unfilled], [Consult Letter:] : I had the pleasure of evaluating your patient, [unfilled]. [Please see my note below.] : Please see my note below. [Consult Closing:] : Thank you very much for allowing me to participate in the care of this patient.  If you have any questions, please do not hesitate to contact me. [Sincerely,] : Sincerely, [DrDipti  ___] : Dr. BAINS [DrDipti ___] : Dr. BAINS [Lance James DO, [facility]] : Lance James DO, [facility] [Director, Respiratory Care] : Director, Respiratory Care [[facility]] : [facility] [FreeTextEntry3] : [facility] DO [facility]\par Pulmonary Critical Care\par Director Pulmonary Division\par Medical Director Respiratory Therapy\par [facility]\par \par

## 2022-12-14 NOTE — HISTORY OF PRESENT ILLNESS
[TextBox_4] : chronic exertional dyspnea\par walk regularly\par has reflux and rhinitis\par no fever, chill, no sig chest pain\par uses Advair bid   and Singulair night time\par worsening in allergy season- mostly nasal congestion, on immunotherapy every other week\par also on Astelin, Flonase, Allegra\par \par 12/14/22\par had covid few months ago\par now baseline\par states very allergic this time of year\par using Advair bid\par using anti histamine\par no active rhinitis [FreeTextEntry1] : \par \par

## 2022-12-14 NOTE — DISCUSSION/SUMMARY
[FreeTextEntry1] : asthma by hx, followed by Allergist- no longer on immunotherapy injections\par more frequent rescue this time of year\par  exam without bronchospasm, normal sp02\par spirometry remains super normal 2/21, will repeat\par continue current mgmt, discussed step up therapy, doesn’t want currently\par Ct chest 2018 with stable nodules x yrs ,CXR 4/22 stable scarring from 2/21\par Had Covid vaccine x3, 2nd booster discussed\par Cardiology follow up\par action plan reviewed\par 4-months or sooner if needed, repeat spirometry asap and call

## 2022-12-14 NOTE — PROCEDURE
[FreeTextEntry1] : spirometry 2/21 remains normal , actually improved from 2018\par CT 3/18: stable nodules , no change from 2010\par CXR 4/22--2/21 no acute change

## 2022-12-15 ENCOUNTER — NON-APPOINTMENT (OUTPATIENT)
Age: 82
End: 2022-12-15

## 2022-12-15 ENCOUNTER — APPOINTMENT (OUTPATIENT)
Dept: PULMONOLOGY | Facility: CLINIC | Age: 82
End: 2022-12-15

## 2022-12-15 VITALS — BODY MASS INDEX: 21.66 KG/M2 | WEIGHT: 106 LBS | HEIGHT: 58.5 IN

## 2022-12-15 DIAGNOSIS — R05.9 COUGH, UNSPECIFIED: ICD-10-CM

## 2022-12-15 DIAGNOSIS — M79.606 PAIN IN LEG, UNSPECIFIED: ICD-10-CM

## 2022-12-15 PROCEDURE — 94010 BREATHING CAPACITY TEST: CPT

## 2022-12-20 ENCOUNTER — OUTPATIENT (OUTPATIENT)
Dept: OUTPATIENT SERVICES | Facility: HOSPITAL | Age: 82
LOS: 1 days | End: 2022-12-20
Payer: MEDICARE

## 2022-12-20 ENCOUNTER — APPOINTMENT (OUTPATIENT)
Dept: ULTRASOUND IMAGING | Facility: CLINIC | Age: 82
End: 2022-12-20

## 2022-12-20 DIAGNOSIS — M79.606 PAIN IN LEG, UNSPECIFIED: ICD-10-CM

## 2022-12-20 PROCEDURE — 93970 EXTREMITY STUDY: CPT | Mod: 26

## 2022-12-20 PROCEDURE — 93970 EXTREMITY STUDY: CPT

## 2023-04-13 ENCOUNTER — APPOINTMENT (OUTPATIENT)
Dept: PULMONOLOGY | Facility: CLINIC | Age: 83
End: 2023-04-13
Payer: MEDICARE

## 2023-04-13 VITALS
HEART RATE: 80 BPM | RESPIRATION RATE: 16 BRPM | OXYGEN SATURATION: 97 % | SYSTOLIC BLOOD PRESSURE: 121 MMHG | HEIGHT: 58.5 IN | DIASTOLIC BLOOD PRESSURE: 80 MMHG | BODY MASS INDEX: 21.86 KG/M2 | WEIGHT: 107 LBS

## 2023-04-13 DIAGNOSIS — J45.909 UNSPECIFIED ASTHMA, UNCOMPLICATED: ICD-10-CM

## 2023-04-13 PROCEDURE — 99214 OFFICE O/P EST MOD 30 MIN: CPT

## 2023-04-13 NOTE — DISCUSSION/SUMMARY
[FreeTextEntry1] : asthma by hx, followed by Allergist- no longer on immunotherapy injections\par at baseline\par  exam without bronchospasm, normal sp02\par spirometry remains super normal \par continue current mgmt, \par Ct chest 2018 with stable nodules x yrs ,CXR 4/22 stable scarring from 2/21, will repeat\par Cardiology follow up\par action plan reviewed\par 6 months or sooner if needed,

## 2023-04-13 NOTE — HISTORY OF PRESENT ILLNESS
[TextBox_4] : chronic exertional dyspnea, at baseline\par walk regularly\par no sig reflux\par has intermittent vertigo\par no fever, chill, no sig chest pain\par uses Advair bid   and Singulair night time\par  [FreeTextEntry1] : \par \par

## 2023-04-13 NOTE — REVIEW OF SYSTEMS
[As Noted in HPI] : as noted in HPI [Negative] : Cardiovascular [FreeTextEntry9] : see Cardiologist/ chest pain work up negative [de-identified] : sees allergist

## 2023-04-13 NOTE — PROCEDURE
[FreeTextEntry1] : spirometry 2/21 remains normal , actually improved from 2018\par CT 3/18: stable nodules , no change from 2010\par CXR 4/22--2/21 no acute change\par spirometry is normal 12/22

## 2023-04-13 NOTE — CONSULT LETTER
[Dear  ___] : Dear  [unfilled], [Consult Letter:] : I had the pleasure of evaluating your patient, [unfilled]. [Please see my note below.] : Please see my note below. [Consult Closing:] : Thank you very much for allowing me to participate in the care of this patient.  If you have any questions, please do not hesitate to contact me. [Sincerely,] : Sincerely, [DrDipti  ___] : Dr. BAINS [DrDipti ___] : Dr. BAINS [[facility] DO, [facility] : [facility] DO, [facility] [Director, Respiratory Care] : Director, Respiratory Care [[facility]] : [facility] [FreeTextEntry3] : [facility] DO [facility]\par Pulmonary Critical Care\par Director Pulmonary Division\par Medical Director Respiratory Therapy\par [facility]\par \par

## 2023-12-05 ENCOUNTER — APPOINTMENT (OUTPATIENT)
Dept: PULMONOLOGY | Facility: CLINIC | Age: 83
End: 2023-12-05

## 2024-02-18 NOTE — PHYSICAL THERAPY INITIAL EVALUATION ADULT - ASSISTIVE DEVICE FOR STAIR TRANSFER, REHAB EVAL
Pt up to R301. Pt assisted from wheelchair to bed with 1 person assist. Treaded socks on patient. Pt offered PRN norco for pain but he declined. Pt currently eating lunch in bed. Pt oriented to unit and how to use call light for help. Bed alarm on. Hourly rounding in place.    single hand rail